# Patient Record
Sex: FEMALE | Race: WHITE | Employment: UNEMPLOYED
[De-identification: names, ages, dates, MRNs, and addresses within clinical notes are randomized per-mention and may not be internally consistent; named-entity substitution may affect disease eponyms.]

---

## 2017-01-12 ENCOUNTER — TELEPHONE (OUTPATIENT)
Dept: FAMILY MEDICINE CLINIC | Facility: CLINIC | Age: 42
End: 2017-01-12

## 2017-01-20 ENCOUNTER — TELEPHONE (OUTPATIENT)
Dept: FAMILY MEDICINE CLINIC | Facility: CLINIC | Age: 42
End: 2017-01-20

## 2017-09-06 ENCOUNTER — OFFICE VISIT (OUTPATIENT)
Dept: FAMILY MEDICINE CLINIC | Age: 42
End: 2017-09-06
Payer: COMMERCIAL

## 2017-09-06 VITALS
BODY MASS INDEX: 29.11 KG/M2 | HEART RATE: 64 BPM | SYSTOLIC BLOOD PRESSURE: 106 MMHG | TEMPERATURE: 97.8 F | OXYGEN SATURATION: 97 % | WEIGHT: 170.5 LBS | DIASTOLIC BLOOD PRESSURE: 62 MMHG | HEIGHT: 64 IN

## 2017-09-06 DIAGNOSIS — F32.A ANXIETY AND DEPRESSION: ICD-10-CM

## 2017-09-06 DIAGNOSIS — N62 LARGE BREASTS: ICD-10-CM

## 2017-09-06 DIAGNOSIS — K62.5 RECTAL BLEED: ICD-10-CM

## 2017-09-06 DIAGNOSIS — G89.29 CHRONIC PAIN OF RIGHT ANKLE: ICD-10-CM

## 2017-09-06 DIAGNOSIS — M54.5 CHRONIC BILATERAL LOW BACK PAIN, WITH SCIATICA PRESENCE UNSPECIFIED: ICD-10-CM

## 2017-09-06 DIAGNOSIS — Z23 NEED FOR TDAP VACCINATION: ICD-10-CM

## 2017-09-06 DIAGNOSIS — K64.9 HEMORRHOIDS, UNSPECIFIED HEMORRHOID TYPE: Primary | ICD-10-CM

## 2017-09-06 DIAGNOSIS — M25.571 CHRONIC PAIN OF RIGHT ANKLE: ICD-10-CM

## 2017-09-06 DIAGNOSIS — F41.9 ANXIETY AND DEPRESSION: ICD-10-CM

## 2017-09-06 DIAGNOSIS — G89.29 CHRONIC BILATERAL LOW BACK PAIN, WITH SCIATICA PRESENCE UNSPECIFIED: ICD-10-CM

## 2017-09-06 PROCEDURE — 99213 OFFICE O/P EST LOW 20 MIN: CPT | Performed by: PHYSICIAN ASSISTANT

## 2017-09-06 ASSESSMENT — ENCOUNTER SYMPTOMS
COUGH: 0
ABDOMINAL PAIN: 0
RHINORRHEA: 0
EYE ITCHING: 0
VOMITING: 0
CHEST TIGHTNESS: 0
SINUS PRESSURE: 0
BOWEL INCONTINENCE: 0
NAUSEA: 0
BACK PAIN: 1
DIARRHEA: 0
SHORTNESS OF BREATH: 0
SORE THROAT: 0
EYE DISCHARGE: 0

## 2017-09-19 ENCOUNTER — ANESTHESIA EVENT (OUTPATIENT)
Dept: OPERATING ROOM | Age: 42
End: 2017-09-19
Payer: COMMERCIAL

## 2017-09-20 ENCOUNTER — HOSPITAL ENCOUNTER (OUTPATIENT)
Age: 42
Setting detail: OUTPATIENT SURGERY
Discharge: HOME | End: 2017-09-20
Attending: COLON & RECTAL SURGERY | Admitting: COLON & RECTAL SURGERY
Payer: COMMERCIAL

## 2017-09-20 ENCOUNTER — ANESTHESIA (OUTPATIENT)
Dept: OPERATING ROOM | Age: 42
End: 2017-09-20
Payer: COMMERCIAL

## 2017-09-20 VITALS
OXYGEN SATURATION: 100 % | SYSTOLIC BLOOD PRESSURE: 104 MMHG | RESPIRATION RATE: 15 BRPM | DIASTOLIC BLOOD PRESSURE: 61 MMHG

## 2017-09-20 VITALS
SYSTOLIC BLOOD PRESSURE: 116 MMHG | RESPIRATION RATE: 14 BRPM | BODY MASS INDEX: 27.74 KG/M2 | OXYGEN SATURATION: 98 % | DIASTOLIC BLOOD PRESSURE: 62 MMHG | HEART RATE: 66 BPM | TEMPERATURE: 97.5 F | HEIGHT: 64 IN | WEIGHT: 162.48 LBS

## 2017-09-20 LAB — HCG, PREGNANCY URINE (POC): NEGATIVE

## 2017-09-20 PROCEDURE — 7100000010 HC PHASE II RECOVERY - FIRST 15 MIN: Performed by: COLON & RECTAL SURGERY

## 2017-09-20 PROCEDURE — 2580000003 HC RX 258: Performed by: ANESTHESIOLOGY

## 2017-09-20 PROCEDURE — 2500000003 HC RX 250 WO HCPCS: Performed by: NURSE ANESTHETIST, CERTIFIED REGISTERED

## 2017-09-20 PROCEDURE — 2580000003 HC RX 258: Performed by: NURSE ANESTHETIST, CERTIFIED REGISTERED

## 2017-09-20 PROCEDURE — 88305 TISSUE EXAM BY PATHOLOGIST: CPT

## 2017-09-20 PROCEDURE — 84703 CHORIONIC GONADOTROPIN ASSAY: CPT

## 2017-09-20 PROCEDURE — 6360000002 HC RX W HCPCS: Performed by: NURSE ANESTHETIST, CERTIFIED REGISTERED

## 2017-09-20 PROCEDURE — 3609010300 HC COLONOSCOPY W/BIOPSY SINGLE/MULTIPLE: Performed by: COLON & RECTAL SURGERY

## 2017-09-20 PROCEDURE — 3700000001 HC ADD 15 MINUTES (ANESTHESIA): Performed by: COLON & RECTAL SURGERY

## 2017-09-20 PROCEDURE — 7100000011 HC PHASE II RECOVERY - ADDTL 15 MIN: Performed by: COLON & RECTAL SURGERY

## 2017-09-20 PROCEDURE — 3700000000 HC ANESTHESIA ATTENDED CARE: Performed by: COLON & RECTAL SURGERY

## 2017-09-20 RX ORDER — SODIUM CHLORIDE 0.9 % (FLUSH) 0.9 %
10 SYRINGE (ML) INJECTION PRN
Status: DISCONTINUED | OUTPATIENT
Start: 2017-09-20 | End: 2017-09-20 | Stop reason: HOSPADM

## 2017-09-20 RX ORDER — SODIUM CHLORIDE, SODIUM LACTATE, POTASSIUM CHLORIDE, CALCIUM CHLORIDE 600; 310; 30; 20 MG/100ML; MG/100ML; MG/100ML; MG/100ML
INJECTION, SOLUTION INTRAVENOUS CONTINUOUS
Status: DISCONTINUED | OUTPATIENT
Start: 2017-09-20 | End: 2017-09-20 | Stop reason: HOSPADM

## 2017-09-20 RX ORDER — LIDOCAINE HYDROCHLORIDE 10 MG/ML
INJECTION, SOLUTION EPIDURAL; INFILTRATION; INTRACAUDAL; PERINEURAL PRN
Status: DISCONTINUED | OUTPATIENT
Start: 2017-09-20 | End: 2017-09-20 | Stop reason: SDUPTHER

## 2017-09-20 RX ORDER — MIDAZOLAM HYDROCHLORIDE 1 MG/ML
1 INJECTION INTRAMUSCULAR; INTRAVENOUS EVERY 5 MIN PRN
Status: DISCONTINUED | OUTPATIENT
Start: 2017-09-20 | End: 2017-09-20 | Stop reason: HOSPADM

## 2017-09-20 RX ORDER — SODIUM CHLORIDE 0.9 % (FLUSH) 0.9 %
10 SYRINGE (ML) INJECTION EVERY 12 HOURS SCHEDULED
Status: DISCONTINUED | OUTPATIENT
Start: 2017-09-20 | End: 2017-09-20 | Stop reason: HOSPADM

## 2017-09-20 RX ORDER — GLYCOPYRROLATE 0.2 MG/ML
INJECTION INTRAMUSCULAR; INTRAVENOUS PRN
Status: DISCONTINUED | OUTPATIENT
Start: 2017-09-20 | End: 2017-09-20 | Stop reason: SDUPTHER

## 2017-09-20 RX ORDER — LIDOCAINE HYDROCHLORIDE 10 MG/ML
1 INJECTION, SOLUTION EPIDURAL; INFILTRATION; INTRACAUDAL; PERINEURAL
Status: DISCONTINUED | OUTPATIENT
Start: 2017-09-20 | End: 2017-09-20 | Stop reason: HOSPADM

## 2017-09-20 RX ORDER — PROPOFOL 10 MG/ML
INJECTION, EMULSION INTRAVENOUS PRN
Status: DISCONTINUED | OUTPATIENT
Start: 2017-09-20 | End: 2017-09-20 | Stop reason: SDUPTHER

## 2017-09-20 RX ORDER — SODIUM CHLORIDE, SODIUM LACTATE, POTASSIUM CHLORIDE, CALCIUM CHLORIDE 600; 310; 30; 20 MG/100ML; MG/100ML; MG/100ML; MG/100ML
INJECTION, SOLUTION INTRAVENOUS CONTINUOUS PRN
Status: DISCONTINUED | OUTPATIENT
Start: 2017-09-20 | End: 2017-09-20 | Stop reason: SDUPTHER

## 2017-09-20 RX ORDER — MIDAZOLAM HYDROCHLORIDE 1 MG/ML
INJECTION INTRAMUSCULAR; INTRAVENOUS PRN
Status: DISCONTINUED | OUTPATIENT
Start: 2017-09-20 | End: 2017-09-20 | Stop reason: SDUPTHER

## 2017-09-20 RX ADMIN — SODIUM CHLORIDE, POTASSIUM CHLORIDE, SODIUM LACTATE AND CALCIUM CHLORIDE: 600; 310; 30; 20 INJECTION, SOLUTION INTRAVENOUS at 07:33

## 2017-09-20 RX ADMIN — LIDOCAINE HYDROCHLORIDE 50 MG: 10 INJECTION, SOLUTION EPIDURAL; INFILTRATION; INTRACAUDAL; PERINEURAL at 07:37

## 2017-09-20 RX ADMIN — PROPOFOL 200 MG: 10 INJECTION, EMULSION INTRAVENOUS at 07:37

## 2017-09-20 RX ADMIN — SODIUM CHLORIDE, POTASSIUM CHLORIDE, SODIUM LACTATE AND CALCIUM CHLORIDE: 600; 310; 30; 20 INJECTION, SOLUTION INTRAVENOUS at 06:39

## 2017-09-20 RX ADMIN — MIDAZOLAM HYDROCHLORIDE 2 MG: 1 INJECTION, SOLUTION INTRAMUSCULAR; INTRAVENOUS at 07:35

## 2017-09-20 RX ADMIN — GLYCOPYRROLATE 0.4 MG: 0.2 INJECTION, SOLUTION INTRAMUSCULAR; INTRAVENOUS at 07:34

## 2017-09-20 RX ADMIN — SODIUM CHLORIDE, POTASSIUM CHLORIDE, SODIUM LACTATE AND CALCIUM CHLORIDE: 600; 310; 30; 20 INJECTION, SOLUTION INTRAVENOUS at 07:54

## 2017-09-20 ASSESSMENT — PAIN SCALES - GENERAL
PAINLEVEL_OUTOF10: 0

## 2017-09-20 ASSESSMENT — ENCOUNTER SYMPTOMS
STRIDOR: 0
SHORTNESS OF BREATH: 0

## 2017-09-20 ASSESSMENT — PAIN - FUNCTIONAL ASSESSMENT: PAIN_FUNCTIONAL_ASSESSMENT: 0-10

## 2017-09-21 LAB — SURGICAL PATHOLOGY REPORT: NORMAL

## 2022-03-11 ENCOUNTER — HOSPITAL ENCOUNTER (OUTPATIENT)
Dept: PHYSICAL THERAPY | Facility: CLINIC | Age: 47
Setting detail: THERAPIES SERIES
Discharge: HOME | End: 2022-03-11
Payer: COMMERCIAL

## 2022-03-11 PROCEDURE — 97161 PT EVAL LOW COMPLEX 20 MIN: CPT

## 2022-03-11 PROCEDURE — 97110 THERAPEUTIC EXERCISES: CPT

## 2022-03-11 PROCEDURE — 97140 MANUAL THERAPY 1/> REGIONS: CPT

## 2022-03-11 NOTE — CONSULTS
[] [facility]) - [facility] &  Therapy  [address]  [phone]  F: [phone] [] [address]  [place] [address]   [address]  P: [phone]  F: [phone] [] [address]  Therapy  [address]  P: [phone]  F: [phone] [] [address]  [facility]   [address]   [place]: [phone]  F: [phone]  [x] [address]  P: [phone]  F: [phone]      Physical Therapy Lower Extremity Evaluation    Date:  3/11/2022  Patient: Mata Kramer  :  1975  MRN: [id number]  Physician: Dr. Calderón Maker: Sanaz ROMERO after 25 visits   Medical Diagnosis: Plantar fascitis   Rehab Codes: M72. 2.  Onset date: ~22  Next 's appt.: TBA    Subjective:   CC: Pain began around  of this year, did fall down stairs twice around that time. Pain is located in heel and shoots up into foot at times. Pain is improved on weekends when not working, worsens when working d/t being on feet throughout the day. Pt does have OTC orthotics (received a month ago) that do help aleviate the pain, does have an order for custom orthotics. Received cortisone injections in Feb which did help alleviate pain.           PMHx: [] Unremarkable [] Diabetes [] HTN  [] Pacemaker   [] MI/Heart Problems [] Cancer [] Arthritis [] Other:              [x] Refer to full medical chart  In EPIC         Tests: [] X-Ray: [] MRI:  [] Other:    Medications: [x] Refer to full medical record [] None [] Other:  Allergies:      [x] Refer to full medical record [] None [] Other:        Marital Status    Home type    Stairs from outside            Hand rail    Stairs inside            [address] [facility] status Full time- nights   Work Activities/duties  On feet all day    Recreational Activities Going for walks       Pain present? Yes   Location Plantar surface of heel   Pain Rating currently 8-9/10 when ambulating    Pain at worse 9/10   Pain at best 0/10   Description of pain Sharp shooting when ambulating, ache constant    Altered Sensation Denies   What makes it worse Ambulating   What makes it better    Symptom progression Worsening    Sleep Not effected                Objective:    ROM  ° A/P STRENGTH    Left Right Left Right   Hip Flex       Ext       ER       IR       ABD       ADD       Knee Flex       Ext       Ankle DF knee ext 15 10 5/5 4+/5   Ankle DF knee flex  22 30     PF   5/5 4+/5   INV       EVER                Bilat heel raise with peroneal weakness noted bilaterally, R>L, therefore not attempted SL     OBSERVATION No Deficit Deficit Not Tested Comments   Posture       Forward Head [] [] []    Rounded Shoulders [] [] []    Observation           Palpation [] [] []    Sensation [] [] []    Edema [] [] []    Neurological [] [] []    Patellar Mobility [] [] []    Patellar Orientation [] [] []    Gait [] [] [] Analysis: Pes planus bilaterally         FUNCTION Normal Difficult Unable   Sitting [x] [] []   Standing [] [x] []   Ambulation [] [x] []   Groom/Dress [x] [] []   Lift/Carry [x] [] []   Stairs [x] [] []   Bending [x] [] []   Squat [x] [] []   Kneel [x] [] []           Flexibility Normal Left tight Right tight   Hip flexor [] [] []   quad [] [] []   HS [] [] []   piriformis [] [] []   ITB [] [] []   gastroc [] [] [x]   Soleus  [] [] [x]                        Functional Test: LEFS Score: 32% functionally impaired         Assessment:   Patient presents with signs and symptoms consistent with plantar fascitis, including pain in calcaneus, TTP in plantar fascia as well as tightness in gastroc.   Patient would benefit from skilled physical therapy services in order to: Decrease tightness in R gastroc and plantar fascia contributing to heel pain. Pt will also benefit from peroneal and foot intrinsic strengthening to improve arch support. Goals:        STG: (to be met in 10 treatments)  1. ? Pain: Decrease pain levels to 4/10 at worst in R heel   2. ? ROM: Increase flexibility and AROM limitations throughout to equal bilat to reduce difficulty with ADLs, increase R DF PROM with knee ext to 20deg  3. ? Strength: Increase R gastroc strenght to perform SL heel raise 10x  4. ? Function: Pt to report completing a full work day without any increase in pain   5. Independent with Home Exercise Programs  6. Demonstrate Knowledge of fall prevention    LTG: (to be met in 20 treatments)  1. Improve score on assessment tool from 32% impaired to 15% impaired, demonstrating an improvement in ADLs  2. Reduce pain levels to 0/10                   Patient goals: To have no pain     Rehab Potential:  [x] Good  [] Fair  [] Poor   Suggested Professional Referral:  [x] No  [] Yes:  Barriers to Goal Achievement[de-identified]  [x] No  [] Yes:  Domestic Concerns:  [x] No  [] Yes:    Pt. Education:  [x] Plans/Goals, Risks/Benefits discussed  [x] Home exercise program    Method of Education: [x] Verbal  [x] Demo  [x] Written  Comprehension of Education:  [x] Verbalizes understanding. [x] Demonstrates understanding. [x] Needs Review. [] Demonstrates/verbalizes understanding of HEP/Ed previously given. Access Code: [email or web address]  URL: [employer]. [email or web address]/  Date: 04/34/1142  Prepared by: Socorro Holliday    Exercises  Foot Dome - 1 x daily - 7 x weekly - 1 sets - 20 reps  Toe Yoga - 1 x daily - 7 x weekly - 1 sets - 20 reps  Long Sitting Calf Stretch with Strap - 1 x daily - 7 x weekly - 2 sets - 3 reps - 30\" hold  Gastroc Stretch on Step - 1 x daily - 7 x weekly - 2 sets - 3 reps - 30\" hold      Treatment Plan:  [x] Therapeutic Exercise    [] Aquatic Therapy  [x] Manual Therapy   [] Electrical Stimulation  [x] Instruction in HEP      [] Lumbar/Cervical Traction  [] Neuromuscular Re-education [x] Cold/hotpack  [] Iontophoresis: 4 mg/mL  [x] Vasocompression ([employer])                    Dexamethasone Sodium  [x] Gait Training             Phosphate 40-80 mAmin  [] Integrative Dry Needling         []  Medication allergies reviewed for use of    Dexamethasone Sodium Phosphate 4mg/ml     with iontophoresis treatments. Pt is not allergic. Frequency:  2 x/week for 20 visits    Todays Treatment:    Exercises:  Exercise  R plantar fascitis    Reps/ Time Weight/ Level Comments   Bike            Long sitting gastroc stretch 3x30\"     Foot Dome x     Toe Yoga x     Step gastroc stretch 3x30\"                                                           Other: MFR to R sided platnar fascia, gastroc with tightness noted medially    Specific Instructions for next treatment: Continue with manual, progress foot intrinsics as tolerated    Evaluation Complexity:  History (Personal factors, comorbidities) [x] 0 [] 1-2 [] 3+   Exam (limitations, restrictions) [x] 1-2 [] 3 [] 4+   Clinical presentation (progression) [x] Stable [] Evolving  [] Unstable   Decision Making [x] Low [] Moderate [] High    [x] Low Complexity [] Moderate Complexity [] High Complexity       Treatment Charges: Mins Units   [x] Evaluation       [x]  Low       []  Moderate       []  High 15 1   []  Modalities     [x]  Ther Exercise 15 1   [x]  Manual Therapy 15 1   []  Ther Activities     []  Aquatics     []  Vasocompression     []  Other       TOTAL TREATMENT TIME: 45 minutes    Time in: 0810    Time Out: 6599    Electronically signed by: Delfino Duncan PT        Physician Signature:________________________________Date:__________________  By signing above or cosigning this note, I have reviewed this plan of care and certify a need for medically necessary rehabilitation services.      *PLEASE SIGN ABOVE AND FAX BACK ALL PAGES*

## 2022-03-17 ENCOUNTER — HOSPITAL ENCOUNTER (OUTPATIENT)
Dept: PHYSICAL THERAPY | Facility: CLINIC | Age: 47
Setting detail: THERAPIES SERIES
Discharge: HOME | End: 2022-03-17
Payer: COMMERCIAL

## 2022-03-17 PROCEDURE — 97110 THERAPEUTIC EXERCISES: CPT

## 2022-03-17 PROCEDURE — 97140 MANUAL THERAPY 1/> REGIONS: CPT

## 2022-03-17 NOTE — FLOWSHEET NOTE
[] [facility] [facility]  [address]  [phone]  F: [phone] [] [address]  [facility]   [address]  P: [phone]  F: [phone] [] [address]  Therapy  [address]  P: [phone]  F: [phone] [x] [address]  P: [phone]  F: [phone] [] [address]  [facility]   [address]   [place]: [phone]  F: [phone]      Physical Therapy Daily Treatment Note    Date:  3/17/2022  Patient Name:  Tapan Guzman    :  1975  MRN: [id number]  Physician: Dr. Wilman Lyman: Sanaz ROMERO after 25 visits   Medical Diagnosis: Plantar fascitis              Rehab Codes: M72. 2.  Onset date: ~22               Next 's appt.: TBA  Visit# / total visits:      Cancels/No Shows: 0/0    Subjective:    Pain:  [] Yes  [] No Location: R heel plantar surface  Pain Rating: (0-10 scale) 6/10  Pain altered Tx:  [] No  [] Yes  Action:  Comments: Pt states to noticing a decrease in stabbing pain since last visit, currently having an \"ache\". Objective:   Todays Treatment:     Exercises:  Exercise  R plantar fascitis     Reps/ Time Weight/ Level Comments   Bike  5'                 Long sitting gastroc stretch 3x30\"       Foot Dome x       Toe Yoga x       Step gastroc stretch 3x30\"        SB stretch 3x30\"                                                                                       Other: MFR to R sided platnar fascia, gastroc with tightness noted medially     Specific Instructions for next treatment: Continue with manual, progress foot intrinsics as tolerated        Treatment Charges: Mins Units   []  Modalities     [x]  Ther Exercise 15 1   [x]  Manual Therapy 20 1   [] Ther Activities     []  Aquatics     []  Vasocompression     []  Other     Total Treatment time 35 2       Assessment: [x] Progressing toward goals. Initiated treatment on bike followed by manual, continued tightness noted in plantar fascia and medial gastroc; decreased tightness post. Cotninued with foot intrinsics with improved performance of foot doming. [] No change. [] Other:  [] Patient presents with signs and symptoms consistent with plantar fascitis, including pain in calcaneus, TTP in plantar fascia as well as tightness in gastroc. Patient would benefit from skilled physical therapy services in order to: Decrease tightness in R gastroc and plantar fascia contributing to heel pain. Pt will also benefit from peroneal and foot intrinsic strengthening to improve arch support.            Goals:                               STG: (to be met in 10 treatments)  1. ? Pain: Decrease pain levels to 4/10 at worst in R heel   2. ? ROM: Increase flexibility and AROM limitations throughout to equal bilat to reduce difficulty with ADLs, increase R DF PROM with knee ext to 20deg  3. ? Strength: Increase R gastroc strenght to perform SL heel raise 10x  4. ? Function: Pt to report completing a full work day without any increase in pain   5. Independent with Home Exercise Programs  6. Demonstrate Knowledge of fall prevention     LTG: (to be met in 20 treatments)  1. Improve score on assessment tool from 32% impaired to 15% impaired, demonstrating an improvement in ADLs  2. Reduce pain levels to 0/10    Pt. Education:  [x] Yes  [] No  [] Reviewed Prior HEP/Ed  Method of Education: [x] Verbal  [x] Demo  [] Written  Comprehension of Education:  [x] Verbalizes understanding. [x] Demonstrates understanding. [] Needs review. [] Demonstrates/verbalizes HEP/Ed previously given. Plan: [x] Continue current frequency toward long and short term goals.           Time In: 0800            Time Out: 7768    Electronically signed by:  Chanel Maldonado, PT

## 2022-03-23 ENCOUNTER — APPOINTMENT (OUTPATIENT)
Dept: PHYSICAL THERAPY | Facility: CLINIC | Age: 47
End: 2022-03-23
Payer: COMMERCIAL

## 2022-03-25 ENCOUNTER — HOSPITAL ENCOUNTER (OUTPATIENT)
Dept: PHYSICAL THERAPY | Facility: CLINIC | Age: 47
Setting detail: THERAPIES SERIES
Discharge: HOME | End: 2022-03-25
Payer: COMMERCIAL

## 2022-03-25 PROCEDURE — 97110 THERAPEUTIC EXERCISES: CPT

## 2022-03-25 PROCEDURE — 97140 MANUAL THERAPY 1/> REGIONS: CPT

## 2022-03-25 NOTE — FLOWSHEET NOTE
[] [facility]  [address]  [phone]  F: [phone] [] [address]  [facility]   [address]  P: [phone]  F: [phone] [] [address]  Therapy  [address] [facility]  P: [phone]  F: [phone] [x] [address]  P: [phone]  F: [phone] [] [address]  [facility]   [address]   [place]: [phone]  F: [phone]      Physical Therapy Daily Treatment Note    Date:  3/25/2022  Patient Name:  Hernandez Pearson    :  1975  MRN: [id number]  Physician: Dr. Hang Fatima: Sanaz ROMERO after 25 visits   Medical Diagnosis: Plantar fascitis              Rehab Codes: M72. 2.  Onset date: ~22               Next 's appt.: TBA  Visit# / total visits: 3/20     Cancels/No Shows: 0/0    Subjective:    Pain:  [] Yes  [] No Location: R heel plantar surface  Pain Rating: (0-10 scale) 3/10  Pain altered Tx:  [] No  [] Yes  Action:  Comments: Pt states to decreased pain secondary to purchasing a night splint to wear at home. Objective:   Todays Treatment:     Exercises:  Exercise  R plantar fascitis     Reps/ Time Weight/ Level Comments   Bike  5'    not today              Long sitting gastroc stretch 3x30\"       Foot Dome x       Toe Yoga x       Step gastroc stretch 3x30\"        SB stretch 3x30\"        MOBO board rocks  20x ea                                                                             Other: MFR to R sided platnar fascia, gastroc with tightness noted medially     Specific Instructions for next treatment: Continue with manual, progress foot intrinsics as tolerated        Treatment Charges: Mins Units   []  Modalities     [x]  Ther Exercise 15 1   [x]  Manual short term goals.           Time In: 0800            Time Out: 4048    Electronically signed by:  Sindi Nava PT

## 2022-03-30 ENCOUNTER — HOSPITAL ENCOUNTER (OUTPATIENT)
Dept: PHYSICAL THERAPY | Facility: CLINIC | Age: 47
Setting detail: THERAPIES SERIES
Discharge: HOME | End: 2022-03-30
Payer: COMMERCIAL

## 2022-03-30 PROCEDURE — 97110 THERAPEUTIC EXERCISES: CPT

## 2022-03-30 PROCEDURE — 97140 MANUAL THERAPY 1/> REGIONS: CPT

## 2022-03-30 NOTE — FLOWSHEET NOTE
[] [facility]  [address]  [phone]  F: [phone] [] [address]  [facility] 36   [address]  P: [phone]  F: [phone] [] [address]  Therapy  [address] [facility]  P: [phone]  F: [phone] [x] [address]  P: [phone]  F: [phone] [] [address]  [facility]   [address]   [place]: [phone]  F: [phone]      Physical Therapy Daily Treatment Note    Date:  3/30/2022  Patient Name:  Rolly Johnson    :  1975  MRN: [id number]  Physician: Dr. Leonora Anthony: Sanaz ROMERO after 25 visits   Medical Diagnosis: Plantar fascitis              Rehab Codes: M72. 2.  Onset date: ~22               Next 's appt.: TBA  Visit# / total visits:      Cancels/No Shows: 0/0    Subjective:    Pain:  [] Yes  [] No Location: R heel plantar surface  Pain Rating: (0-10 scale) 3/10  Pain altered Tx:  [] No  [] Yes  Action:  Comments: Pt arrives with decreased pain overall. States to wearing orthotics for two weeks, however notices an increase in arch pain after wearing. Therefore tried not wearing last night at work, and has less pain upon arrival today. Pt does not work again until  night, educated pt to again trying not wearing orthotics to see if pain continues to decrease. Objective:   Todays Treatment:     Exercises:  Exercise  R plantar fascitis     Reps/ Time Weight/ Level Comments   Bike  5'               Long sitting gastroc stretch 3x30\"       Foot Dome- Seated x       Toe Yoga- Seated x       Step gastroc stretch 3x30\"        SB stretch 3x30\"        MOBO board rocks - Seated 20x ea                                                                             Other: MFR to R sided platnar fascia, gastroc with tightness noted medially     Specific Instructions for next treatment: Assess patient's response to holding orthotics while at work. Progress foot intrinsics to standing if again arrives without pain. Treatment Charges: Mins Units   []  Modalities     [x]  Ther Exercise 15 1   [x]  Manual Therapy 20 1   []  Ther Activities     []  Aquatics     []  Vasocompression     []  Other     Total Treatment time 35 2       Assessment: [x] Progressing toward goals. Initiated treatment on bike followed by manual, continued tightness noted in plantar fascia and medial gastroc; decreased tightness post. Continued with seated foot intrinsics, pt with contineud difficulty with isolating EHL, possibly secondary to webbed second and third toe. [] No change. [] Other:  [] Patient presents with signs and symptoms consistent with plantar fascitis, including pain in calcaneus, TTP in plantar fascia as well as tightness in gastroc. Patient would benefit from skilled physical therapy services in order to: Decrease tightness in R gastroc and plantar fascia contributing to heel pain. Pt will also benefit from peroneal and foot intrinsic strengthening to improve arch support.            Goals:                               STG: (to be met in 10 treatments)  1. ? Pain: Decrease pain levels to 4/10 at worst in R heel   2. ? ROM: Increase flexibility and AROM limitations throughout to equal bilat to reduce difficulty with ADLs, increase R DF PROM with knee ext to 20deg  3. ? Strength: Increase R gastroc strenght to perform SL heel raise 10x  4. ? Function: Pt to report completing a full work day without any increase in pain   5. Independent with Home Exercise Programs  6. Demonstrate Knowledge of fall prevention     LTG: (to be met in 20 treatments)  1.  Improve score on assessment tool from 32% impaired to 15% impaired, demonstrating an improvement in ADLs  2. Reduce pain levels to 0/10    Pt. Education:  [x] Yes  [] No  [] Reviewed Prior HEP/Ed  Method of Education: [x] Verbal  [x] Demo  [] Written  Comprehension of Education:  [x] Verbalizes understanding. [x] Demonstrates understanding. [] Needs review. [] Demonstrates/verbalizes HEP/Ed previously given. Plan: [x] Continue current frequency toward long and short term goals.           Time In: 0900            Time Out: 0935    Electronically signed by:  Leticia Badillo PT

## 2022-04-01 ENCOUNTER — APPOINTMENT (OUTPATIENT)
Dept: PHYSICAL THERAPY | Facility: CLINIC | Age: 47
End: 2022-04-01
Payer: COMMERCIAL

## 2022-04-04 ENCOUNTER — HOSPITAL ENCOUNTER (OUTPATIENT)
Dept: PHYSICAL THERAPY | Facility: CLINIC | Age: 47
Setting detail: THERAPIES SERIES
Discharge: HOME | End: 2022-04-04
Payer: COMMERCIAL

## 2022-04-04 PROCEDURE — 97140 MANUAL THERAPY 1/> REGIONS: CPT

## 2022-04-04 PROCEDURE — 97110 THERAPEUTIC EXERCISES: CPT

## 2022-04-04 NOTE — FLOWSHEET NOTE
[] [facility]  [address]  [phone]  F: [phone] [] [address]  [facility]   [address]  P: [phone]  F: [phone] [] [address] &  Therapy  [address]  P: [phone]  F: [phone] [x] [address]  P: [phone]  F: [phone] [] [address]  [facility]   [address]   [place]: [phone]  F: [phone]      Physical Therapy Daily Treatment Note    Date:  2022  Patient Name:  Ana Bazan    :  1975  MRN: [id number]  Physician: Dr. Deysi Garcia: Sanaz ROMERO after 25 visits   Medical Diagnosis: Plantar fascitis              Rehab Codes: M72. 2.  Onset date: ~22               Next 's appt.: TBA  Visit# / total visits:      Cancels/No Shows: 0/0    Subjective:    Pain:  [] Yes  [] No Location: R heel plantar surface  Pain Rating: (0-10 scale) 2/10  Pain altered Tx:  [] No  [] Yes  Action:  Comments: Pt reports this is the best her foot has felt yet. Objective: Todays Treatment:     Exercises:  Exercise  R plantar fascitis     Reps/ Time Weight/ Level Comments    Bike  5'   x              Long sitting gastroc/soleus stretch 3x30\"     x   Foot Dome- Standing  10x 5\"   x   Toe Yoga- Standing 10x     x   Step gastroc stretch 3x30\"     x    SB stretch 3x30\"     x    MOBO board rocks - Standing 15x ea     x                                                                                Other: MFR to R sided platnar fascia, gastroc with tightness noted medially     Specific Instructions for next treatment: Assess patient's response to holding orthotics while at work. Progress foot intrinsics to standing if again arrives without pain. Treatment Charges: Mins Units   []  Modalities     [x]  Ther Exercise 30 2   [x]  Manual Therapy 15 1   []  Ther Activities     []  Aquatics     []  Vasocompression     []  Other     Total Treatment time 45 3       Assessment: [x] Progressing toward goals. Progressed pt with foot intrinsic exercises in standing with fair to good bel, pt requires max verbal, tactile, and visual cues with mirror to keep ankle neutral throughout strengthening exercises with good follow through noted. Pt reports slight incr pain in heel with standing foot strengthening exercises, however pain decr when she was able to maintain neutral ankle posture and avoid foot pronation. PTA advised pt to ice at home if pain persists. [] No change. [] Other:  [x] Patient presents with signs and symptoms consistent with plantar fascitis, including pain in calcaneus, TTP in plantar fascia as well as tightness in gastroc. Patient would benefit from skilled physical therapy services in order to: Decrease tightness in R gastroc and plantar fascia contributing to heel pain. Pt will also benefit from peroneal and foot intrinsic strengthening to improve arch support.            Goals:  STG: (to be met in 10 treatments)  1. ? Pain: Decrease pain levels to 4/10 at worst in R heel   2. ? ROM: Increase flexibility and AROM limitations throughout to equal bilat to reduce difficulty with ADLs, increase R DF PROM with knee ext to 20deg  3. ? Strength: Increase R gastroc strenght to perform SL heel raise 10x  4. ? Function: Pt to report completing a full work day without any increase in pain   5. Independent with Home Exercise Programs  6. Demonstrate Knowledge of fall prevention     LTG: (to be met in 20 treatments)  1. Improve score on assessment tool from 32% impaired to 15% impaired, demonstrating an improvement in ADLs  2. Reduce pain levels to 0/10    Pt.  Education:  [x] Yes  [] No  [] Reviewed Prior HEP/Ed  Method of Education: [x] Verbal [x] Demo  [] Written  Comprehension of Education:  [x] Verbalizes understanding. [x] Demonstrates understanding. [] Needs review. [] Demonstrates/verbalizes HEP/Ed previously given. Plan: [x] Continue current frequency toward long and short term goals.           Time In: 8:00am            Time Out: 8:45am    Electronically signed by:  Yarelis Rey PTA

## 2022-04-07 ENCOUNTER — HOSPITAL ENCOUNTER (OUTPATIENT)
Dept: PHYSICAL THERAPY | Facility: CLINIC | Age: 47
Setting detail: THERAPIES SERIES
Discharge: HOME | End: 2022-04-07
Payer: COMMERCIAL

## 2022-04-07 PROCEDURE — 97140 MANUAL THERAPY 1/> REGIONS: CPT

## 2022-04-07 PROCEDURE — 97016 VASOPNEUMATIC DEVICE THERAPY: CPT

## 2022-04-07 PROCEDURE — 97110 THERAPEUTIC EXERCISES: CPT

## 2022-04-07 NOTE — FLOWSHEET NOTE
[] [address]  [address]  [phone]  F: [phone] [] [address]  [facility] 36   [address]  P: [phone]  F: [phone] [] [address]  Therapy  [address]  P: [phone]  F: [phone] [x] [address]  P: [phone]  F: [phone] [] [address]  [facility]   [address]   [place]: [phone]  F: [phone]      Physical Therapy Daily Treatment Note    Date:  2022  Patient Name:  Jackie López    :  1975  MRN: [id number]  Physician: Dr. Clark Allen: Sanaz ROMERO after 25 visits   Medical Diagnosis: Plantar fascitis              Rehab Codes: M72. 2.  Onset date: ~22               Next 's appt.: TBA  Visit# / total visits:      Cancels/No Shows: 0/0    Subjective:    Pain:  [x] Yes  [] No Location: R heel plantar surface  Pain Rating: (0-10 scale) 6-7/10  Pain altered Tx:  [] No  [x] Yes  Action:  Comments: Pt reports working over the last two nights with increased pain at 6-7/10. Pt has not been wearing inserts in work boots. Objective:   Todays Treatment:     Exercises:  Exercise  R plantar fascitis     Reps/ Time Weight/ Level Comments    Bike  5'   x              Long sitting gastroc/soleus stretch 3x30\"     x   Foot Dome- Standing  10x 10\"   x   Toe Yoga- Standing 2x10     x   Step gastroc stretch 3x30\"     x    SB stretch 3x30\"     x    MOBO board rocks - Standing 15x ea    with L LE on 2\" step  --              Lat tband walks  2 laps     band around feet  x                                                          Other: MFR to R platnar fascia, gastroc with tightness noted medially     Specific Instructions for next treatment: Assess patient's response to holding orthotics while at work. Progress foot intrinsics to standing if again arrives without pain. Treatment Charges: Mins Units   []  Modalities     [x]  Ther Exercise 30 2   [x]  Manual Therapy 15 1   []  Ther Activities     []  Aquatics     [x]  Vasocompression 10 1   []  Other     Total Treatment time 55 4       Assessment: [x] Progressing toward goals. Initiated tx with warm up on bike followed by manual with pt reporting complete pain relief in R foot upon standing. Pt then completed standing gastroc/soleus stretches, reports pain increased to 4/10 after stretches. Pt tehn completed foot intrinsic strengthening with emphasis on ankle neutral with good bel. Added lateral band walks with good bel. Ended with vaso for pain and inflammation management. [] No change. [] Other:  [x] Patient presents with signs and symptoms consistent with plantar fascitis, including pain in calcaneus, TTP in plantar fascia as well as tightness in gastroc. Patient would benefit from skilled physical therapy services in order to: Decrease tightness in R gastroc and plantar fascia contributing to heel pain. Pt will also benefit from peroneal and foot intrinsic strengthening to improve arch support.         Goals:  STG: (to be met in 10 treatments)  1. ? Pain: Decrease pain levels to 4/10 at worst in R heel   2. ? ROM: Increase flexibility and AROM limitations throughout to equal bilat to reduce difficulty with ADLs, increase R DF PROM with knee ext to 20deg  3. ? Strength: Increase R gastroc strenght to perform SL heel raise 10x  4. ? Function: Pt to report completing a full work day without any increase in pain   5. Independent with Home Exercise Programs  6. Demonstrate Knowledge of fall prevention     LTG: (to be met in 20 treatments)  1. Improve score on assessment tool from 32% impaired to 15% impaired, demonstrating an improvement in ADLs  2. Reduce pain levels to 0/10    Pt.  Education: [x] Yes  [] No  [] Reviewed Prior HEP/Ed  Method of Education: [x] Verbal  [x] Demo  [] Written  Comprehension of Education:  [x] Verbalizes understanding. [x] Demonstrates understanding. [] Needs review. [] Demonstrates/verbalizes HEP/Ed previously given. Plan: [x] Continue current frequency toward long and short term goals.           Time In: 8:00am            Time Out: 9:00am    Electronically signed by:  Nico Jordan, PTA

## 2022-04-12 ENCOUNTER — HOSPITAL ENCOUNTER (OUTPATIENT)
Dept: PHYSICAL THERAPY | Facility: CLINIC | Age: 47
Setting detail: THERAPIES SERIES
Discharge: HOME | End: 2022-04-12
Payer: COMMERCIAL

## 2022-04-12 PROCEDURE — 97110 THERAPEUTIC EXERCISES: CPT

## 2022-04-12 PROCEDURE — 97140 MANUAL THERAPY 1/> REGIONS: CPT

## 2022-04-12 PROCEDURE — 97016 VASOPNEUMATIC DEVICE THERAPY: CPT

## 2022-04-12 NOTE — FLOWSHEET NOTE
[] [address]  [address]  [phone]  F: [phone] [] [address]  [facility]   [address]  P: [phone]  F: [phone] [] [address]  Therapy  [address]  P: [phone]  F: [phone] [x] [address]  P: [phone]  F: [phone] [] [address]  [facility]   [address]   [place]: [phone]  F: [phone]      Physical Therapy Daily Treatment Note    Date:  2022  Patient Name:  Sinan Stoner    :  1975  MRN: [id number]  Physician: Dr. Violet Mitchell: Sanaz ROMERO after 25 visits   Medical Diagnosis: Plantar fascitis              Rehab Codes: M72. 2.  Onset date: ~22               Next 's appt.: TBA  Visit# / total visits:      Cancels/No Shows: 0/0    Subjective:    Pain:  [x] Yes  [] No Location: R heel plantar surface  Pain Rating: (0-10 scale) 4/10  Pain altered Tx:  [] No  [x] Yes  Action:  Comments: Pt states she feels as though she is \"on the mend\" with slightly decreased R heel pain. Objective:   Todays Treatment:     Exercises:  Exercise  R plantar fascitis     Reps/ Time Weight/ Level Comments    Bike  5'   x              Long sitting gastroc/soleus stretch 3x30\"     x   Foot Dome- Standing  10x 10\"   x   Toe Yoga- Standing 2x10     x   Step gastroc stretch 3x30\"     x    SB stretch 3x30\"    gastroc and soleus  x    MOBO board rocks - Standing 15x ea    with L LE on 2\" step  x              Lat tband walks  2 laps     band around feet  -   Other: MFR to R platnar fascia, gastroc with tightness noted medially     Specific Instructions for next treatment: Consider IASTM via sidekick to PF     Treatment Charges: Mins Units   [] Modalities     [x]  Ther Exercise 20 1   [x]  Manual Therapy 10 1   []  Ther Activities     []  Aquatics     [x]  Vasocompression 10 1   []  Other     Total Treatment time 40 3       Assessment: [x] Progressing toward goals. Initiated tx with bike x5' with good bel. PTA continued with deep STM and hypervolt to R gastroc/soleus complex to decrease tension placed on PF. Pt demos difficulty with GTE during toe yoga. Sig improved technique noted with foot doming with neutral ankle. Ended with vaso for inflammation management. [] No change. [] Other:    [x] Patient presents with signs and symptoms consistent with plantar fascitis, including pain in calcaneus, TTP in plantar fascia as well as tightness in gastroc. Patient would benefit from skilled physical therapy services in order to: Decrease tightness in R gastroc and plantar fascia contributing to heel pain. Pt will also benefit from peroneal and foot intrinsic strengthening to improve arch support.         Goals:  STG: (to be met in 10 treatments)  1. ? Pain: Decrease pain levels to 4/10 at worst in R heel   2. ? ROM: Increase flexibility and AROM limitations throughout to equal bilat to reduce difficulty with ADLs, increase R DF PROM with knee ext to 20deg  3. ? Strength: Increase R gastroc strenght to perform SL heel raise 10x  4. ? Function: Pt to report completing a full work day without any increase in pain   5. Independent with Home Exercise Programs  6. Demonstrate Knowledge of fall prevention     LTG: (to be met in 20 treatments)  1. Improve score on assessment tool from 32% impaired to 15% impaired, demonstrating an improvement in ADLs  2. Reduce pain levels to 0/10    Pt. Education:  [x] Yes  [] No  [] Reviewed Prior HEP/Ed  Method of Education: [x] Verbal  [x] Demo  [] Written  Comprehension of Education:  [x] Verbalizes understanding. [x] Demonstrates understanding. [] Needs review.   [] Demonstrates/verbalizes HEP/Ed previously given.     Plan: [x] Continue current frequency toward long and short term goals.           Time In: 3:30pm            Time Out: 4:10pm    Electronically signed by:  Martha Montes De Oca PTA

## 2022-04-14 ENCOUNTER — HOSPITAL ENCOUNTER (OUTPATIENT)
Dept: PHYSICAL THERAPY | Facility: CLINIC | Age: 47
Setting detail: THERAPIES SERIES
Discharge: HOME | End: 2022-04-14
Payer: COMMERCIAL

## 2022-04-14 NOTE — FLOWSHEET NOTE
[] [address]  [address]    [phone]  F: [phone]   [] [address]  [facility]   [address]  P: [phone]  F: [phone]  [] [place]  [address] [facility] [address]  P: [phone]  F: [phone] [x] [address]: [phone]  F: [phone]  [] [address]  [facility]   [address]   [place]: [phone]  F: [phone]   [] [address]  [place]: [phone]   F: [phone]     Physical Therapy Cancel/No Show note    Date: 2022  Patient: Peng Powers  : 1975  MRN: [id number]    Cancels/No Shows to date:     For today's appointment patient:    [x]  Cancelled    [] Rescheduled appointment    [] No-show     Reason given by patient:    []  Patient ill    []  Conflicting appointment    [] No transportation      [] Conflict with work    [x] No reason given    [] Weather related    [] COVID-19    [x] Other:      Comments: Pt states she will call next week to reschedule         [] Next appointment was confirmed    Electronically signed by: Karlee Chavez, PT

## 2022-04-21 ENCOUNTER — HOSPITAL ENCOUNTER (OUTPATIENT)
Dept: PHYSICAL THERAPY | Facility: CLINIC | Age: 47
Setting detail: THERAPIES SERIES
Discharge: HOME | End: 2022-04-21
Payer: COMMERCIAL

## 2022-04-21 PROCEDURE — 97140 MANUAL THERAPY 1/> REGIONS: CPT

## 2022-04-21 PROCEDURE — 97110 THERAPEUTIC EXERCISES: CPT

## 2022-04-21 NOTE — FLOWSHEET NOTE
PF     Treatment Charges: Mins Units   []  Modalities     [x]  Ther Exercise 23 2   [x]  Manual Therapy 18 1   []  Ther Activities     []  Aquatics     [x]  Vasocompression     []  Other     Total Treatment time 41 3       Assessment: [x] Progressing toward goals. Pt continued with bike to warm up and then received manual therapy including STM and hypervolt to gastroc and soleus to decrease muscle tension and pain. Continued with therex as listed above. [] No change. [] Other:    [x] Patient presents with signs and symptoms consistent with plantar fascitis, including pain in calcaneus, TTP in plantar fascia as well as tightness in gastroc. Patient would benefit from skilled physical therapy services in order to: Decrease tightness in R gastroc and plantar fascia contributing to heel pain. Pt will also benefit from peroneal and foot intrinsic strengthening to improve arch support.         Goals:  STG: (to be met in 10 treatments)  1. ? Pain: Decrease pain levels to 4/10 at worst in R heel   2. ? ROM: Increase flexibility and AROM limitations throughout to equal bilat to reduce difficulty with ADLs, increase R DF PROM with knee ext to 20deg  3. ? Strength: Increase R gastroc strenght to perform SL heel raise 10x  4. ? Function: Pt to report completing a full work day without any increase in pain   5. Independent with Home Exercise Programs  6. Demonstrate Knowledge of fall prevention     LTG: (to be met in 20 treatments)  1. Improve score on assessment tool from 32% impaired to 15% impaired, demonstrating an improvement in ADLs  2. Reduce pain levels to 0/10    Pt. Education:  [x] Yes  [] No  [] Reviewed Prior HEP/Ed  Method of Education: [x] Verbal  [x] Demo  [] Written  Comprehension of Education:  [x] Verbalizes understanding. [x] Demonstrates understanding. [] Needs review. [] Demonstrates/verbalizes HEP/Ed previously given. Plan: [x] Continue current frequency toward long and short term goals. Time In: 0800            Time Out: 0213    Electronically signed by:  Sita Coreas PTA

## 2022-04-26 ENCOUNTER — HOSPITAL ENCOUNTER (OUTPATIENT)
Dept: PHYSICAL THERAPY | Facility: CLINIC | Age: 47
Setting detail: THERAPIES SERIES
Discharge: HOME | End: 2022-04-26
Payer: COMMERCIAL

## 2022-04-26 PROCEDURE — 97110 THERAPEUTIC EXERCISES: CPT

## 2022-04-26 PROCEDURE — 97140 MANUAL THERAPY 1/> REGIONS: CPT

## 2022-04-26 NOTE — FLOWSHEET NOTE
[] [address]  [address]  [phone]  F: [phone] [] [address]  [facility]   [address]  P: [phone]  F: [phone] [] [address]  Therapy  [address] [facility]  P: [phone]  F: [phone] [x] [address]  P: [phone]  F: [phone] [] [address]  [facility]   [address]   [place]: [phone]  F: [phone]      Physical Therapy Daily Treatment Note    Date:  2022  Patient Name:  Troy Mcdonald    :  1975  MRN: [id number]  Physician: Dr. Patel Para: Sanaz ROMERO after 25 visits   Medical Diagnosis: Plantar fascitis              Rehab Codes: M72. 2.  Onset date: ~22               Next 's appt.: TBA  Visit# / total visits:      Cancels/No Shows: 1/0    Subjective:    Pain:  [x] Yes  [] No Location: R heel plantar surface  Pain Rating: (0-10 scale) 3-4/10  Pain altered Tx:  [] No  [x] Yes  Action:  Comments: Pt reports R heel is feeling pretty good this morning, states she did work last night, however was not on her feet as much as normal.     Objective:   Todays Treatment:     Exercises:  Exercise  R plantar fascitis     Reps/ Time Weight/ Level Comments    Bike  5'   x              Giuliana gastroc str 3x30\"     x   Foot Dome- Standing  10x 10\"   x   Toe Yoga- Standing 2x10     x   Step gastroc stretch 3x30\"     x    SB stretch 3x30\"    gastroc and soleus  x    MOBO board rocks - Standing 20x ea    with L LE on 2\" step  x   Ball roll  3'  On PF - seated  x              Lat tband walks  2 laps     band around feet  -   Other: IASTM via Hawk  to R PF, deep STM to R gastroc/soleus     Specific Instructions for next treatment: Consider (with shoes on) DL squat to chair, SLS, SLS with 3 way kick    Treatment Charges: Mins Units   []  Modalities     [x]  Ther Exercise 30 2   [x]  Manual Therapy 15 1   []  Ther Activities     []  Aquatics     [x]  Vasocompression     []  Other     Total Treatment time 45 3       Assessment: [x] Progressing toward goals. PTA performed aggressive IASTM via Hawk  to R PF as well as deep STM to R gastroc and soleus. Upon standing with shoes on, pt denies all pain. Pt cont with stretches as well as foot intrinsic strengthening with fair bel, d/t incr pain when standing without shoe on. Plan to progress next visit focusing on foot arch during functional and stability exercises. [] No change. [] Other:    [x] Patient presents with signs and symptoms consistent with plantar fascitis, including pain in calcaneus, TTP in plantar fascia as well as tightness in gastroc. Patient would benefit from skilled physical therapy services in order to: Decrease tightness in R gastroc and plantar fascia contributing to heel pain. Pt will also benefit from peroneal and foot intrinsic strengthening to improve arch support.         Goals:  STG: (to be met in 10 treatments)  1. ? Pain: Decrease pain levels to 4/10 at worst in R heel   2. ? ROM: Increase flexibility and AROM limitations throughout to equal bilat to reduce difficulty with ADLs, increase R DF PROM with knee ext to 20deg  3. ? Strength: Increase R gastroc strenght to perform SL heel raise 10x  4. ? Function: Pt to report completing a full work day without any increase in pain   5. Independent with Home Exercise Programs  6. Demonstrate Knowledge of fall prevention     LTG: (to be met in 20 treatments)  1. Improve score on assessment tool from 32% impaired to 15% impaired, demonstrating an improvement in ADLs  2. Reduce pain levels to 0/10    Pt.  Education:  [x] Yes  [] No  [] Reviewed Prior HEP/Ed  Method of Education: [x] Verbal  [x] Demo  [] Written  Comprehension of Education:  [x] Verbalizes understanding. [x] Demonstrates understanding. [] Needs review. [] Demonstrates/verbalizes HEP/Ed previously given. Plan: [x] Continue current frequency toward long and short term goals.           Time In: 8:00am            Time Out: 8:50am    Electronically signed by:  Jenny Rodgers PTA

## 2022-04-29 ENCOUNTER — HOSPITAL ENCOUNTER (OUTPATIENT)
Dept: PHYSICAL THERAPY | Facility: CLINIC | Age: 47
Setting detail: THERAPIES SERIES
Discharge: HOME | End: 2022-04-29
Payer: COMMERCIAL

## 2022-04-29 PROCEDURE — 97140 MANUAL THERAPY 1/> REGIONS: CPT

## 2022-04-29 PROCEDURE — 97110 THERAPEUTIC EXERCISES: CPT

## 2022-04-29 NOTE — FLOWSHEET NOTE
[] [address]  [address]  [phone]  F: [phone] [] [address]  [facility]   [address]  P: [phone]  F: [phone] [] [address]  Therapy  [address]  P: [phone]  F: [phone] [x] [address]  P: [phone]  F: [phone] [] [address]  [facility]   [address]   [place]: [phone]  F: [phone]      Physical Therapy Daily Treatment Note    Date:  2022  Patient Name:  Yolanda Garcia    :  1975  MRN: [id number]  Physician: Dr. Evelyn Day: Sanaz ROMERO after 25 visits   Medical Diagnosis: Plantar fascitis              Rehab Codes: M72. 2.  Onset date: ~22               Next 's appt.: TBA  Visit# / total visits: 10/20     Cancels/No Shows: 1/0    Subjective:    Pain:  [x] Yes  [] No Location: R heel plantar surface  Pain Rating: (0-10 scale) 3-4/10  Pain altered Tx:  [] No  [x] Yes  Action:  Comments: Pt has pain located at the heel that is currently 3/10, noted that pain gets up to 7/10 at work. Pt is currently wearing older boots, but she is getting new boots on monday. Objective:   Todays Treatment:     Exercises:  Exercise  R plantar fascitis     Reps/ Time Weight/ Level Comments    Bike  5'   x              Giuliana gastroc str 3x30\"     x   Foot Dome- Standing  10x 10\"   x   Toe Yoga- Standing 2x10     x   Step gastroc stretch 3x30\"     x    SB stretch 3x30\"    gastroc and soleus  x    MOBO board rocks - Standing 20x ea    with L LE on 2\" step  x   Ball roll  3'  On PF - seated  x   DL squat to chair 2x10   x   SLS 2x30\"   x   SLS with 3 way kick 5x ea   x              Lat tband walks  2 laps     band around feet  -   Other: Written  Comprehension of Education:  [x] Verbalizes understanding. [x] Demonstrates understanding. [] Needs review. [] Demonstrates/verbalizes HEP/Ed previously given. Plan: [x] Continue current frequency toward long and short term goals.           Time In: 0800            Time Out: 0900    Electronically signed by:  Claus Alex PTA

## 2022-05-03 ENCOUNTER — HOSPITAL ENCOUNTER (OUTPATIENT)
Dept: PHYSICAL THERAPY | Facility: CLINIC | Age: 47
Setting detail: THERAPIES SERIES
Discharge: HOME | End: 2022-05-03
Payer: COMMERCIAL

## 2022-05-03 PROCEDURE — 97140 MANUAL THERAPY 1/> REGIONS: CPT

## 2022-05-03 PROCEDURE — 97110 THERAPEUTIC EXERCISES: CPT

## 2022-05-03 NOTE — FLOWSHEET NOTE
[] [address]  [address]  [phone]  F: [phone] [] [address]  [facility] 36   [address]  P: [phone]  F: [phone] [] [address]  Therapy  [address] [facility]  P: [phone]  F: [phone] [x] [address]  P: [phone]  F: [phone] [] [address]  [facility]   [address]   [place]: [phone]  F: [phone]      Physical Therapy Daily Treatment Note    Date:  5/3/2022  Patient Name:  Negra Jordan    :  1975  MRN: [id number]  Physician: Dr. Pete Poisson: Sanaz ROMERO after 25 visits   Medical Diagnosis: Plantar fascitis              Rehab Codes: M72. 2.  Onset date: ~22               Next 's appt.: TBA  Visit# / total visits:      Cancels/No Shows: 1/0    Subjective:    Pain:  [x] Yes  [] No Location: R heel plantar surface  Pain Rating: (0-10 scale) 2-3/10  Pain altered Tx:  [] No  [x] Yes  Action:  Comments: Pt states pain reached 3-4/10 last night at work, and up to 4-5/10 over the weekend. Objective: Todays Treatment:     Exercises:  Exercise  R plantar fascitis     Reps/ Time Weight/ Level Comments    Bike  5'   x              Giuliana gastroc str 3x30\"     x   Foot Dome- Standing  10x 10\"   -   Toe Yoga- Standing 2x10     -   Step gastroc stretch 3x30\"     x    SB stretch 3x30\"    gastroc and soleus  x    MOBO board rocks - Standing 20x ea    with L LE on 2\" step  -   Ball roll  3'  On PF - seated  x   DL squat to chair 2x10   x   SLS 2x30\"   x   SLS with 3 way kick 2x10   x   Lat tband walks  2 laps     band around ankles  x   Calf raises  2x10   x   Other: IASTM via Hawk  to R PF, deep STM to R gastroc/soleus;  Hypervolt- fork attachment to gastroc/soleus     Specific Instructions for next treatment:     Treatment Charges: Mins Units   []  Modalities     [x]  Ther Exercise 25 2   [x]  Manual Therapy 20 1   []  Ther Activities     []  Aquatics     [x]  Vasocompression     []  Other     Total Treatment time 45 3       Assessment: [x] Progressing toward goals. Continued with manual as listed above with no pain reported post. Focused on functional strengthening this date with fair to good tolerance. Pt reports more foot and ankle fatigue after SLS activities. Ended with ball roll on PF with positive relief reported after. [] No change. [] Other:    [x] Patient presents with signs and symptoms consistent with plantar fascitis, including pain in calcaneus, TTP in plantar fascia as well as tightness in gastroc. Patient would benefit from skilled physical therapy services in order to: Decrease tightness in R gastroc and plantar fascia contributing to heel pain. Pt will also benefit from peroneal and foot intrinsic strengthening to improve arch support.         Goals:  STG: (to be met in 10 treatments)  1. ? Pain: Decrease pain levels to 4/10 at worst in R heel   2. ? ROM: Increase flexibility and AROM limitations throughout to equal bilat to reduce difficulty with ADLs, increase R DF PROM with knee ext to 20deg  3. ? Strength: Increase R gastroc strenght to perform SL heel raise 10x  4. ? Function: Pt to report completing a full work day without any increase in pain   5. Independent with Home Exercise Programs  6. Demonstrate Knowledge of fall prevention     LTG: (to be met in 20 treatments)  1. Improve score on assessment tool from 32% impaired to 15% impaired, demonstrating an improvement in ADLs  2. Reduce pain levels to 0/10    Pt. Education:  [x] Yes  [] No  [] Reviewed Prior HEP/Ed  Method of Education: [x] Verbal  [x] Demo  [] Written  Comprehension of Education:  [x] Verbalizes understanding. [x] Demonstrates understanding.   [] Needs review. [] Demonstrates/verbalizes HEP/Ed previously given. Plan: [x] Continue current frequency toward long and short term goals.           Time In: 8:00am            Time Out: 8:45am    Electronically signed by:  Astrid Jang PTA

## 2022-05-06 ENCOUNTER — HOSPITAL ENCOUNTER (OUTPATIENT)
Dept: PHYSICAL THERAPY | Facility: CLINIC | Age: 47
Setting detail: THERAPIES SERIES
Discharge: HOME | End: 2022-05-06
Payer: COMMERCIAL

## 2022-05-06 PROCEDURE — 97140 MANUAL THERAPY 1/> REGIONS: CPT

## 2022-05-06 PROCEDURE — 97110 THERAPEUTIC EXERCISES: CPT

## 2022-05-06 NOTE — FLOWSHEET NOTE
[] [address]  [address]  [phone]  F: [phone] [] [address]  [facility]   [address]  P: [phone]  F: [phone] [] [address]  Therapy  [address] [facility]  P: [phone]  F: [phone] [x] [address]  P: [phone]  F: [phone] [] [address]  [facility]   [address]   [place]: [phone]  F: [phone]      Physical Therapy Daily Treatment Note    Date:  2022  Patient Name:  Cristiane Good    :  1975  MRN: [id number]  Physician: Dr. Ferny Gray: Sanaz ROMERO after 25 visits   Medical Diagnosis: Plantar fascitis              Rehab Codes: M72. 2.  Onset date: ~22               Next 's appt.: TBA  Visit# / total visits:      Cancels/No Shows: 1/0    Subjective:    Pain:  [x] Yes  [] No Location: R heel plantar surface  Pain Rating: (0-10 scale) 1/10  Pain altered Tx:  [] No  [x] Yes  Action:  Comments: Pt arrives with decreased pain d/t not working last night. States to purchasing new shoes that are arriving tomorrow, and is hopefull about trying new shoes in combination with new orthotics. Objective:   Todays Treatment:     Exercises:  Exercise  R plantar fascitis     Reps/ Time Weight/ Level Comments    Bike  5'   x              Giuliana gastroc str 3x30\"     x   Foot Dome- Standing  10x 10\"   -   Toe Yoga- Standing 2x10     -   Step gastroc stretch 3x30\"     x    SB stretch 3x30\"    gastroc and soleus  x    MOBO board rocks - Standing 20x ea    with L LE on 2\" step  -   Ball roll  3'  On PF - seated  x   DL squat to chair 2x10   x   SLS 2x30\"   x   SLS with 3 way kick 2x10   x   Lat tband walks  2 laps     band around ankles  x Calf raises  2x10   x   Other: IASTM via Hawk  to R PF, deep STM to R gastroc/soleus; Hypervolt- fork attachment to gastroc/soleus     Specific Instructions for next treatment:     Treatment Charges: Mins Units   []  Modalities     [x]  Ther Exercise 25 2   [x]  Manual Therapy 20 1   []  Ther Activities     []  Aquatics     [x]  Vasocompression     []  Other     Total Treatment time 45 3       Assessment: [x] Progressing toward goals. Continued with manual as listed above with no pain reported post. Also continued to focus on hip and peroneal strengthening with good tolerance. Plan on one additional visit next week to monitor how foot feels with new shoes and orthotics. [] No change. [] Other:    [x] Patient presents with signs and symptoms consistent with plantar fascitis, including pain in calcaneus, TTP in plantar fascia as well as tightness in gastroc. Patient would benefit from skilled physical therapy services in order to: Decrease tightness in R gastroc and plantar fascia contributing to heel pain. Pt will also benefit from peroneal and foot intrinsic strengthening to improve arch support.         Goals:  STG: (to be met in 10 treatments)  1. ? Pain: Decrease pain levels to 4/10 at worst in R heel   2. ? ROM: Increase flexibility and AROM limitations throughout to equal bilat to reduce difficulty with ADLs, increase R DF PROM with knee ext to 20deg  3. ? Strength: Increase R gastroc strenght to perform SL heel raise 10x  4. ? Function: Pt to report completing a full work day without any increase in pain   5. Independent with Home Exercise Programs  6. Demonstrate Knowledge of fall prevention     LTG: (to be met in 20 treatments)  1. Improve score on assessment tool from 32% impaired to 15% impaired, demonstrating an improvement in ADLs  2. Reduce pain levels to 0/10    Pt.  Education:  [x] Yes  [] No  [] Reviewed Prior HEP/Ed  Method of Education: [x] Verbal  [x] Demo  [] Written  Comprehension of Education:  [x] Verbalizes understanding. [x] Demonstrates understanding. [] Needs review. [] Demonstrates/verbalizes HEP/Ed previously given. Plan: [x] Continue current frequency toward long and short term goals.           Time In: 8:00am            Time Out: 8:40am    Electronically signed by:  Quynh Cho PT

## 2022-05-12 ENCOUNTER — HOSPITAL ENCOUNTER (OUTPATIENT)
Dept: PHYSICAL THERAPY | Facility: CLINIC | Age: 47
Setting detail: THERAPIES SERIES
Discharge: HOME | End: 2022-05-12
Payer: COMMERCIAL

## 2022-05-12 PROCEDURE — 97140 MANUAL THERAPY 1/> REGIONS: CPT

## 2022-05-12 PROCEDURE — 97110 THERAPEUTIC EXERCISES: CPT

## 2022-05-12 NOTE — FLOWSHEET NOTE
[] [address]  [address]  [phone]  F: [phone] [] [address]  [facility] [address]   [address]  P: [phone]  F: [phone] [] [address]  Therapy  [address] [facility]  P: [phone]  F: [phone] [x] [address]  P: [phone]  F: [phone] [] [address]  [facility]   [address]   [place]: [phone]  F: [phone]      Physical Therapy Daily Treatment Note    Date:  2022  Patient Name:  Christophe Chin    :  1975  MRN: [id number]  Physician: Dr. Fofana Brood: Sanaz ROMERO after 25 visits   Medical Diagnosis: Plantar fascitis              Rehab Codes: M72. 2.  Onset date: ~22               Next 's appt.: TBA  Visit# / total visits:      Cancels/No Shows: 1/0    Subjective:    Pain:  [x] Yes  [] No Location: R heel plantar surface  Pain Rating: (0-10 scale) 1/10  Pain altered Tx:  [] No  [x] Yes  Action:  Comments: Pt reported that she is having very little pain, reporting she is only having pain while getting out of the car. Objective:   Todays Treatment:     Exercises:  Exercise  R plantar fascitis     Reps/ Time Weight/ Level Comments    Bike  5'   x              Giuliana gastroc str 3x30\"     x   Foot Dome- Standing  10x 10\"   -   Toe Yoga- Standing 2x10     -   Step gastroc stretch 3x30\"     x    SB stretch 3x30\"    gastroc and soleus  x    MOBO board rocks - Standing 20x ea    with L LE on 2\" step  x   Ball roll  3'  On PF - seated  -   DL squat to chair 2x10   x   SLS 2x30\"   x   SLS with 3 way kick 2x10   x   Lat tband walks  2 laps     band around ankles  x   Calf raises  2x10   x   Other: IASTM via Hawk  to R PF, deep STM to R gastroc/soleus; Hypervolt- fork attachment to gastroc/soleus     Specific Instructions for next treatment:     Treatment Charges: Mins Units   []  Modalities     [x]  Ther Exercise 26 2   [x]  Manual Therapy 20 1   []  Ther Activities     []  Aquatics     [x]  Vasocompression     []  Other     Total Treatment time 46 3       Assessment: [x] Progressing toward goals. Pt with good tolerance to activities with focus on technique due to possible discharge. Pt received manual as listed above. Pt completed LEFS with score of 96.25% functional.     [] No change. [] Other:    [x] Patient presents with signs and symptoms consistent with plantar fascitis, including pain in calcaneus, TTP in plantar fascia as well as tightness in gastroc. Patient would benefit from skilled physical therapy services in order to: Decrease tightness in R gastroc and plantar fascia contributing to heel pain. Pt will also benefit from peroneal and foot intrinsic strengthening to improve arch support.         Goals:  STG: (to be met in 10 treatments)  1. ? Pain: Decrease pain levels to 4/10 at worst in R heel MET  2. ? ROM: Increase flexibility and AROM limitations throughout to equal bilat to reduce difficulty with ADLs, increase R DF PROM with knee ext to 20deg MET  3. ? Strength: Increase R gastroc strenght to perform SL heel raise 10x MET  4. ? Function: Pt to report completing a full work day without any increase in pain MET  5. Independent with Home Exercise Programs MET  6. Demonstrate Knowledge of fall prevention MET     LTG: (to be met in 20 treatments)  1. Improve score on assessment tool from 32% impaired to 15% impaired, demonstrating an improvement in ADLs  2. Reduce pain levels to 0/10 MET    Pt. Education:  [x] Yes  [] No  [] Reviewed Prior HEP/Ed  Method of Education: [x] Verbal  [x] Demo  [] Written  Comprehension of Education:  [x] Verbalizes understanding. [x] Demonstrates understanding. [] Needs review.   [] Demonstrates/verbalizes HEP/Ed previously given. Plan: [x] Continue current frequency toward long and short term goals.           Time In: 0800            Time Out: 1174    Electronically signed by:  Claus Alex PTA

## 2023-11-27 SDOH — ECONOMIC STABILITY: INCOME INSECURITY: HOW HARD IS IT FOR YOU TO PAY FOR THE VERY BASICS LIKE FOOD, HOUSING, MEDICAL CARE, AND HEATING?: PATIENT DECLINED

## 2023-11-27 SDOH — ECONOMIC STABILITY: FOOD INSECURITY: WITHIN THE PAST 12 MONTHS, THE FOOD YOU BOUGHT JUST DIDN'T LAST AND YOU DIDN'T HAVE MONEY TO GET MORE.: PATIENT DECLINED

## 2023-11-27 SDOH — ECONOMIC STABILITY: TRANSPORTATION INSECURITY
IN THE PAST 12 MONTHS, HAS LACK OF TRANSPORTATION KEPT YOU FROM MEETINGS, WORK, OR FROM GETTING THINGS NEEDED FOR DAILY LIVING?: NO

## 2023-11-27 SDOH — ECONOMIC STABILITY: FOOD INSECURITY: WITHIN THE PAST 12 MONTHS, YOU WORRIED THAT YOUR FOOD WOULD RUN OUT BEFORE YOU GOT MONEY TO BUY MORE.: PATIENT DECLINED

## 2023-11-27 SDOH — ECONOMIC STABILITY: HOUSING INSECURITY
IN THE LAST 12 MONTHS, WAS THERE A TIME WHEN YOU DID NOT HAVE A STEADY PLACE TO SLEEP OR SLEPT IN A SHELTER (INCLUDING NOW)?: PATIENT REFUSED

## 2023-11-28 ENCOUNTER — OFFICE VISIT (OUTPATIENT)
Age: 48
End: 2023-11-28
Payer: COMMERCIAL

## 2023-11-28 VITALS
SYSTOLIC BLOOD PRESSURE: 124 MMHG | DIASTOLIC BLOOD PRESSURE: 80 MMHG | HEIGHT: 63 IN | BODY MASS INDEX: 34.3 KG/M2 | WEIGHT: 193.6 LBS | RESPIRATION RATE: 13 BRPM | TEMPERATURE: 98.1 F | HEART RATE: 76 BPM

## 2023-11-28 DIAGNOSIS — H66.002 NON-RECURRENT ACUTE SUPPURATIVE OTITIS MEDIA OF LEFT EAR WITHOUT SPONTANEOUS RUPTURE OF TYMPANIC MEMBRANE: Primary | ICD-10-CM

## 2023-11-28 PROCEDURE — G8417 CALC BMI ABV UP PARAM F/U: HCPCS | Performed by: FAMILY MEDICINE

## 2023-11-28 PROCEDURE — G8484 FLU IMMUNIZE NO ADMIN: HCPCS | Performed by: FAMILY MEDICINE

## 2023-11-28 PROCEDURE — 99213 OFFICE O/P EST LOW 20 MIN: CPT | Performed by: FAMILY MEDICINE

## 2023-11-28 PROCEDURE — G8427 DOCREV CUR MEDS BY ELIG CLIN: HCPCS | Performed by: FAMILY MEDICINE

## 2023-11-28 PROCEDURE — 1036F TOBACCO NON-USER: CPT | Performed by: FAMILY MEDICINE

## 2023-11-28 RX ORDER — VENLAFAXINE HYDROCHLORIDE 75 MG/1
CAPSULE, EXTENDED RELEASE ORAL
COMMUNITY
Start: 2023-11-10

## 2023-11-28 RX ORDER — AZITHROMYCIN 250 MG/1
250 TABLET, FILM COATED ORAL SEE ADMIN INSTRUCTIONS
Qty: 6 TABLET | Refills: 0 | Status: SHIPPED | OUTPATIENT
Start: 2023-11-28 | End: 2023-12-03

## 2023-11-28 ASSESSMENT — PATIENT HEALTH QUESTIONNAIRE - PHQ9
SUM OF ALL RESPONSES TO PHQ9 QUESTIONS 1 & 2: 0
3. TROUBLE FALLING OR STAYING ASLEEP: 0
SUM OF ALL RESPONSES TO PHQ QUESTIONS 1-9: 0
6. FEELING BAD ABOUT YOURSELF - OR THAT YOU ARE A FAILURE OR HAVE LET YOURSELF OR YOUR FAMILY DOWN: 0
SUM OF ALL RESPONSES TO PHQ QUESTIONS 1-9: 0
9. THOUGHTS THAT YOU WOULD BE BETTER OFF DEAD, OR OF HURTING YOURSELF: 0
2. FEELING DOWN, DEPRESSED OR HOPELESS: 0
5. POOR APPETITE OR OVEREATING: 0
SUM OF ALL RESPONSES TO PHQ QUESTIONS 1-9: 0
10. IF YOU CHECKED OFF ANY PROBLEMS, HOW DIFFICULT HAVE THESE PROBLEMS MADE IT FOR YOU TO DO YOUR WORK, TAKE CARE OF THINGS AT HOME, OR GET ALONG WITH OTHER PEOPLE: 0
SUM OF ALL RESPONSES TO PHQ QUESTIONS 1-9: 0
1. LITTLE INTEREST OR PLEASURE IN DOING THINGS: 0
7. TROUBLE CONCENTRATING ON THINGS, SUCH AS READING THE NEWSPAPER OR WATCHING TELEVISION: 0
8. MOVING OR SPEAKING SO SLOWLY THAT OTHER PEOPLE COULD HAVE NOTICED. OR THE OPPOSITE, BEING SO FIGETY OR RESTLESS THAT YOU HAVE BEEN MOVING AROUND A LOT MORE THAN USUAL: 0
4. FEELING TIRED OR HAVING LITTLE ENERGY: 0

## 2023-12-08 NOTE — TELEPHONE ENCOUNTER
Bekah Hope is calling to request a refill on the following medication(s):    Medication Request:  Requested Prescriptions     Pending Prescriptions Disp Refills    diclofenac (VOLTAREN) 50 MG EC tablet [Pharmacy Med Name: DICLOFENAC SOD EC 50 MG TAB] 60 tablet      Sig: TAKE ONE TABLET BY MOUTH TWICE A DAY AS NEEDED       Last Visit Date (If Applicable):  Visit date not found    Next Visit Date:    Visit date not found

## 2024-03-18 NOTE — TELEPHONE ENCOUNTER
Prerna Denson is calling to request a refill on the following medication(s):    Medication Request:  Requested Prescriptions     Pending Prescriptions Disp Refills    diclofenac (VOLTAREN) 50 MG EC tablet [Pharmacy Med Name: DICLOFENAC SOD EC 50 MG TAB] 60 tablet 1     Sig: TAKE 1 TABLET BY MOUTH TWICE A DAY AS NEEDED       Last Visit Date (If Applicable):  Visit date not found    Next Visit Date:    Visit date not found

## 2024-04-24 ENCOUNTER — OFFICE VISIT (OUTPATIENT)
Age: 49
End: 2024-04-24
Payer: COMMERCIAL

## 2024-04-24 VITALS
SYSTOLIC BLOOD PRESSURE: 128 MMHG | BODY MASS INDEX: 34.16 KG/M2 | WEIGHT: 192.8 LBS | DIASTOLIC BLOOD PRESSURE: 82 MMHG | TEMPERATURE: 98.6 F | HEART RATE: 78 BPM | RESPIRATION RATE: 18 BRPM | OXYGEN SATURATION: 100 % | HEIGHT: 63 IN

## 2024-04-24 DIAGNOSIS — R63.5 WEIGHT GAIN: ICD-10-CM

## 2024-04-24 DIAGNOSIS — K51.90 ULCERATIVE COLITIS IN REMISSION (HCC): Primary | ICD-10-CM

## 2024-04-24 PROCEDURE — 99213 OFFICE O/P EST LOW 20 MIN: CPT | Performed by: FAMILY MEDICINE

## 2024-04-24 PROCEDURE — G8427 DOCREV CUR MEDS BY ELIG CLIN: HCPCS | Performed by: FAMILY MEDICINE

## 2024-04-24 PROCEDURE — G8417 CALC BMI ABV UP PARAM F/U: HCPCS | Performed by: FAMILY MEDICINE

## 2024-04-24 PROCEDURE — 1036F TOBACCO NON-USER: CPT | Performed by: FAMILY MEDICINE

## 2024-04-24 RX ORDER — PHENTERMINE HYDROCHLORIDE 37.5 MG/1
37.5 TABLET ORAL
Qty: 30 TABLET | Refills: 0 | Status: SHIPPED | OUTPATIENT
Start: 2024-04-24 | End: 2024-05-24

## 2024-04-24 ASSESSMENT — PATIENT HEALTH QUESTIONNAIRE - PHQ9
4. FEELING TIRED OR HAVING LITTLE ENERGY: NOT AT ALL
SUM OF ALL RESPONSES TO PHQ QUESTIONS 1-9: 0
3. TROUBLE FALLING OR STAYING ASLEEP: NOT AT ALL
SUM OF ALL RESPONSES TO PHQ9 QUESTIONS 1 & 2: 0
SUM OF ALL RESPONSES TO PHQ QUESTIONS 1-9: 0
9. THOUGHTS THAT YOU WOULD BE BETTER OFF DEAD, OR OF HURTING YOURSELF: NOT AT ALL
SUM OF ALL RESPONSES TO PHQ QUESTIONS 1-9: 0
1. LITTLE INTEREST OR PLEASURE IN DOING THINGS: NOT AT ALL
5. POOR APPETITE OR OVEREATING: NOT AT ALL
6. FEELING BAD ABOUT YOURSELF - OR THAT YOU ARE A FAILURE OR HAVE LET YOURSELF OR YOUR FAMILY DOWN: NOT AT ALL
10. IF YOU CHECKED OFF ANY PROBLEMS, HOW DIFFICULT HAVE THESE PROBLEMS MADE IT FOR YOU TO DO YOUR WORK, TAKE CARE OF THINGS AT HOME, OR GET ALONG WITH OTHER PEOPLE: NOT DIFFICULT AT ALL
SUM OF ALL RESPONSES TO PHQ QUESTIONS 1-9: 0
8. MOVING OR SPEAKING SO SLOWLY THAT OTHER PEOPLE COULD HAVE NOTICED. OR THE OPPOSITE, BEING SO FIGETY OR RESTLESS THAT YOU HAVE BEEN MOVING AROUND A LOT MORE THAN USUAL: NOT AT ALL
7. TROUBLE CONCENTRATING ON THINGS, SUCH AS READING THE NEWSPAPER OR WATCHING TELEVISION: NOT AT ALL
2. FEELING DOWN, DEPRESSED OR HOPELESS: NOT AT ALL

## 2024-04-24 NOTE — PROGRESS NOTES
[facility]     Date of Visit:  2024  Patient Name: Prerna Denson   Patient :  1975     CHIEF COMPLAINT/HPI:     Prerna Denson is a 48 y.o. female who presents today for an general visit to be evaluated for the following condition(s):  Chief Complaint   Patient presents with    Weight Loss     Patient is here to discuss weight loss   Patient would also like lab orders      Patient has been gaining weight her BMI is over 34.  She does have ulcerative colitis that is in remission and occasionally flares up with stress.  REVIEW OF SYSTEM      Review of Systems    Patient has no other healthcare issues.  No fever no sweats no chills. No chest pain nor shortness of breath. No nausea nor vomiting no diarrhea . No  complaints.  No edema issues.    REVIEWED INFORMATION      Allergies   Allergen Reactions    Amoxicillin Rash     Other reaction(s): AOF, Unknown       Current Outpatient Medications   Medication Sig Dispense Refill    phentermine (ADIPEX-P) 37.5 MG tablet Take 1 tablet by mouth every morning (before breakfast) for 30 days. Max Daily Amount: 37.5 mg 30 tablet 0    diclofenac (VOLTAREN) 50 MG EC tablet TAKE 1 TABLET BY MOUTH TWICE A DAY AS NEEDED 60 tablet 1    venlafaxine (EFFEXOR XR) 75 MG extended release capsule Take 1 capsule by mouth daily       No current facility-administered medications for this visit.        Patient Active Problem List   Diagnosis    Hemorrhoids    Rectal bleed    Anxiety and depression    Chronic pain of right ankle    Ulcerative colitis in remission (HCC)    Weight gain       Past Medical History:   Diagnosis Date    Anxiety     Blood in stool     \"every time I go\", worse the past couple of months     Dental crowns present     Depression     Diarrhea     SOMETIMES MUCUS/BLOOD    Hemorrhoids     Tendon pain        Past Surgical History:   Procedure Laterality Date     SECTION      x2    COLONOSCOPY   approx    [facility]    COLONOSCOPY  2017

## 2024-04-26 ENCOUNTER — HOSPITAL ENCOUNTER (OUTPATIENT)
Age: 49
Discharge: HOME | End: 2024-04-26
Payer: COMMERCIAL

## 2024-04-26 DIAGNOSIS — K51.90 ULCERATIVE COLITIS IN REMISSION (HCC): ICD-10-CM

## 2024-04-26 DIAGNOSIS — R63.5 WEIGHT GAIN: ICD-10-CM

## 2024-04-26 LAB
ALBUMIN SERPL-MCNC: 3.6 G/DL (ref 3.5–5.2)
ALBUMIN/GLOB SERPL: 2 {RATIO} (ref 1–2.5)
ALP SERPL-CCNC: 68 U/L (ref 35–104)
ALT SERPL-CCNC: 12 U/L (ref 10–35)
ANION GAP SERPL CALCULATED.3IONS-SCNC: 8 MMOL/L (ref 9–16)
AST SERPL-CCNC: 18 U/L (ref 10–35)
BILIRUB DIRECT SERPL-MCNC: <0.2 MG/DL (ref 0–0.3)
BILIRUB INDIRECT SERPL-MCNC: 0.2 MG/DL (ref 0–1)
BILIRUB SERPL-MCNC: 0.3 MG/DL (ref 0–1.2)
BUN SERPL-MCNC: 12 MG/DL (ref 6–20)
CALCIUM SERPL-MCNC: 8.3 MG/DL (ref 8.6–10.4)
CHLORIDE SERPL-SCNC: 103 MMOL/L (ref 98–107)
CHOLEST SERPL-MCNC: 173 MG/DL (ref 0–199)
CHOLESTEROL/HDL RATIO: 3
CO2 SERPL-SCNC: 28 MMOL/L (ref 20–31)
CREAT SERPL-MCNC: 0.7 MG/DL (ref 0.5–0.9)
ERYTHROCYTE [DISTWIDTH] IN BLOOD BY AUTOMATED COUNT: 16.6 % (ref 11.8–14.4)
GFR SERPL CREATININE-BSD FRML MDRD: >90 ML/MIN/1.73M2
GLUCOSE SERPL-MCNC: 89 MG/DL (ref 74–99)
HCT VFR BLD AUTO: 40.3 % (ref 36.3–47.1)
HDLC SERPL-MCNC: 53 MG/DL
HGB BLD-MCNC: 12.7 G/DL (ref 11.9–15.1)
LDLC SERPL CALC-MCNC: 86 MG/DL (ref 0–100)
MCH RBC QN AUTO: 26.3 PG (ref 25.2–33.5)
MCHC RBC AUTO-ENTMCNC: 31.5 G/DL (ref 28.4–34.8)
MCV RBC AUTO: 83.6 FL (ref 82.6–102.9)
NRBC BLD-RTO: 0 PER 100 WBC
PLATELET # BLD AUTO: 319 K/UL (ref 138–453)
PMV BLD AUTO: 9.8 FL (ref 8.1–13.5)
POTASSIUM SERPL-SCNC: 4.2 MMOL/L (ref 3.7–5.3)
PROT SERPL-MCNC: 5.6 G/DL (ref 6.6–8.7)
RBC # BLD AUTO: 4.82 M/UL (ref 3.95–5.11)
SODIUM SERPL-SCNC: 139 MMOL/L (ref 136–145)
TRIGL SERPL-MCNC: 169 MG/DL
TSH SERPL DL<=0.05 MIU/L-ACNC: 1.53 UIU/ML (ref 0.27–4.2)
VLDLC SERPL CALC-MCNC: 34 MG/DL
WBC OTHER # BLD: 12.8 K/UL (ref 3.5–11.3)

## 2024-04-26 PROCEDURE — 36415 COLL VENOUS BLD VENIPUNCTURE: CPT

## 2024-04-26 PROCEDURE — 84443 ASSAY THYROID STIM HORMONE: CPT

## 2024-04-26 PROCEDURE — 85027 COMPLETE CBC AUTOMATED: CPT

## 2024-04-26 PROCEDURE — 80061 LIPID PANEL: CPT

## 2024-04-26 PROCEDURE — 82248 BILIRUBIN DIRECT: CPT

## 2024-04-26 PROCEDURE — 80053 COMPREHEN METABOLIC PANEL: CPT

## 2024-04-27 NOTE — RESULT ENCOUNTER NOTE
Please call patient labs were good except white count was slightly elevated which usually means some inflammation going on   repeat CBC 1 week

## 2024-05-01 ENCOUNTER — HOSPITAL ENCOUNTER (OUTPATIENT)
Age: 49
Discharge: HOME | End: 2024-05-01
Payer: COMMERCIAL

## 2024-05-01 DIAGNOSIS — K51.90 ULCERATIVE COLITIS IN REMISSION (HCC): ICD-10-CM

## 2024-05-01 LAB
ERYTHROCYTE [DISTWIDTH] IN BLOOD BY AUTOMATED COUNT: 16.5 % (ref 11.8–14.4)
HCT VFR BLD AUTO: 38.1 % (ref 36.3–47.1)
HGB BLD-MCNC: 12.3 G/DL (ref 11.9–15.1)
MCH RBC QN AUTO: 26.9 PG (ref 25.2–33.5)
MCHC RBC AUTO-ENTMCNC: 32.3 G/DL (ref 28.4–34.8)
MCV RBC AUTO: 83.4 FL (ref 82.6–102.9)
NRBC BLD-RTO: 0 PER 100 WBC
PLATELET # BLD AUTO: 339 K/UL (ref 138–453)
PMV BLD AUTO: 9.6 FL (ref 8.1–13.5)
RBC # BLD AUTO: 4.57 M/UL (ref 3.95–5.11)
WBC OTHER # BLD: 14.1 K/UL (ref 3.5–11.3)

## 2024-05-01 PROCEDURE — 85027 COMPLETE CBC AUTOMATED: CPT

## 2024-05-01 PROCEDURE — 36415 COLL VENOUS BLD VENIPUNCTURE: CPT

## 2024-05-24 ENCOUNTER — OFFICE VISIT (OUTPATIENT)
Age: 49
End: 2024-05-24
Payer: COMMERCIAL

## 2024-05-24 VITALS
OXYGEN SATURATION: 98 % | BODY MASS INDEX: 32.6 KG/M2 | HEIGHT: 63 IN | SYSTOLIC BLOOD PRESSURE: 130 MMHG | DIASTOLIC BLOOD PRESSURE: 80 MMHG | WEIGHT: 184 LBS | HEART RATE: 84 BPM | RESPIRATION RATE: 14 BRPM

## 2024-05-24 DIAGNOSIS — K51.90 ULCERATIVE COLITIS IN REMISSION (HCC): Primary | ICD-10-CM

## 2024-05-24 DIAGNOSIS — R63.5 WEIGHT GAIN: ICD-10-CM

## 2024-05-24 PROBLEM — F39 UNSPECIFIED MOOD (AFFECTIVE) DISORDER (HCC): Status: ACTIVE | Noted: 2023-12-20

## 2024-05-24 PROBLEM — N62 HYPERTROPHY OF BREAST: Status: ACTIVE | Noted: 2017-12-05

## 2024-05-24 PROBLEM — K51.30 ULCERATIVE RECTOSIGMOIDITIS WITHOUT COMPLICATION (HCC): Status: ACTIVE | Noted: 2024-05-24

## 2024-05-24 PROCEDURE — G8417 CALC BMI ABV UP PARAM F/U: HCPCS | Performed by: FAMILY MEDICINE

## 2024-05-24 PROCEDURE — 99213 OFFICE O/P EST LOW 20 MIN: CPT | Performed by: FAMILY MEDICINE

## 2024-05-24 PROCEDURE — G8427 DOCREV CUR MEDS BY ELIG CLIN: HCPCS | Performed by: FAMILY MEDICINE

## 2024-05-24 PROCEDURE — 1036F TOBACCO NON-USER: CPT | Performed by: FAMILY MEDICINE

## 2024-05-24 RX ORDER — PHENTERMINE HYDROCHLORIDE 37.5 MG/1
37.5 TABLET ORAL
Qty: 30 TABLET | Refills: 0 | Status: SHIPPED | OUTPATIENT
Start: 2024-05-24 | End: 2024-06-23

## 2024-05-24 NOTE — PROGRESS NOTES
[facility]     Date of Visit:  2024  Patient Name: Prerna Denson   Patient :  1975     CHIEF COMPLAINT/HPI:     Prerna Denson is a 48 y.o. female who presents today for an general visit to be evaluated for the following condition(s):  Chief Complaint   Patient presents with    Annual Exam    Discuss Labs    Discuss Medications       Patient had very successful weight loss.  But on her laboratory studies she had elevated white count on 2 occasions for no reason.  She did not have any illnesses going on she was not taking any steroids no flareup of her colitis.  Lab Results   Component Value Date/Time     2024 10:00 AM    K 4.2 2024 10:00 AM     2024 10:00 AM    CO2 28 2024 10:00 AM    BUN 12 2024 10:00 AM    CREATININE 0.7 2024 10:00 AM    GLUCOSE 89 2024 10:00 AM    CALCIUM 8.3 2024 10:00 AM    LABGLOM >90 2024 10:00 AM        No results found for: \"MALBCR\"     Lab Results   Component Value Date    CHOL 173 2024    TRIG 169 (H) 2024    HDL 53 2024    VLDL 34 2024    CHOLHDLRATIO 3.0 2024        Lab Results   Component Value Date    WBC 14.1 (H) 2024    HGB 12.3 2024    HCT 38.1 2024    MCV 83.4 2024     2024       Lab Results   Component Value Date    ALT 12 2024    AST 18 2024    ALKPHOS 68 2024    BILITOT 0.3 2024        Lab Results   Component Value Date    TSH 1.53 2024        REVIEW OF SYSTEM      Review of Systems  Patient feels finePatient has no other healthcare issues.  No fever no sweats no chills. No chest pain nor shortness of breath. No nausea nor vomiting no diarrhea . No  complaints.  No edema issues.      REVIEWED INFORMATION      Allergies   Allergen Reactions    Amoxicillin Rash     Other reaction(s): AOF, Unknown       Current Outpatient Medications   Medication Sig Dispense Refill    phentermine

## 2024-06-25 ENCOUNTER — NURSE ONLY (OUTPATIENT)
Age: 49
End: 2024-06-25

## 2024-06-25 VITALS — SYSTOLIC BLOOD PRESSURE: 130 MMHG | WEIGHT: 177 LBS | BODY MASS INDEX: 31.35 KG/M2 | DIASTOLIC BLOOD PRESSURE: 80 MMHG

## 2024-06-25 DIAGNOSIS — R63.5 WEIGHT GAIN: Primary | ICD-10-CM

## 2024-06-25 RX ORDER — PHENTERMINE HYDROCHLORIDE 37.5 MG/1
37.5 TABLET ORAL
Qty: 30 TABLET | Refills: 0 | Status: SHIPPED | OUTPATIENT
Start: 2024-06-25 | End: 2024-07-25

## 2024-06-25 NOTE — PROGRESS NOTES
Patient is here for NV for bp and weight check for medication Adipex. Patient states no negative side effects, helps curve appetite.   Patients bp and weight today was   /80   Wt 80.3 kg (177 lb)   BMI 31.35 kg/m²  . patient has lost 7  Approved by Corby Jesus MD  Patient needs a refill of Adipex

## 2024-07-18 NOTE — TELEPHONE ENCOUNTER
Prerna Denson is calling to request a refill on the following medication(s):    Medication Request:  Requested Prescriptions     Pending Prescriptions Disp Refills    diclofenac (VOLTAREN) 50 MG EC tablet [Pharmacy Med Name: DICLOFENAC SOD EC 50 MG TAB] 60 tablet 1     Sig: TAKE 1 TABLET BY MOUTH TWICE A DAY AS NEEDED       Last Visit Date (If Applicable):  5/24/2024    Next Visit Date:    7/24/2024

## 2024-07-24 ENCOUNTER — OFFICE VISIT (OUTPATIENT)
Age: 49
End: 2024-07-24
Payer: COMMERCIAL

## 2024-07-24 VITALS
SYSTOLIC BLOOD PRESSURE: 130 MMHG | HEART RATE: 77 BPM | DIASTOLIC BLOOD PRESSURE: 88 MMHG | OXYGEN SATURATION: 99 % | WEIGHT: 182 LBS | TEMPERATURE: 97.6 F | BODY MASS INDEX: 32.25 KG/M2 | HEIGHT: 63 IN

## 2024-07-24 DIAGNOSIS — R63.5 WEIGHT GAIN: ICD-10-CM

## 2024-07-24 DIAGNOSIS — K04.7 DENTAL INFECTION: ICD-10-CM

## 2024-07-24 DIAGNOSIS — M10.072 ACUTE IDIOPATHIC GOUT INVOLVING TOE OF LEFT FOOT: Primary | ICD-10-CM

## 2024-07-24 PROCEDURE — 99213 OFFICE O/P EST LOW 20 MIN: CPT | Performed by: FAMILY MEDICINE

## 2024-07-24 PROCEDURE — 1036F TOBACCO NON-USER: CPT | Performed by: FAMILY MEDICINE

## 2024-07-24 PROCEDURE — G8427 DOCREV CUR MEDS BY ELIG CLIN: HCPCS | Performed by: FAMILY MEDICINE

## 2024-07-24 PROCEDURE — G8417 CALC BMI ABV UP PARAM F/U: HCPCS | Performed by: FAMILY MEDICINE

## 2024-07-24 RX ORDER — PHENTERMINE HYDROCHLORIDE 37.5 MG/1
37.5 TABLET ORAL
Qty: 30 TABLET | Refills: 0 | Status: SHIPPED | OUTPATIENT
Start: 2024-07-24 | End: 2024-08-23

## 2024-07-24 RX ORDER — DOXYCYCLINE HYCLATE 100 MG
100 TABLET ORAL 2 TIMES DAILY
Qty: 20 TABLET | Refills: 0 | Status: SHIPPED | OUTPATIENT
Start: 2024-07-24 | End: 2024-08-03

## 2024-07-24 NOTE — PROGRESS NOTES
[facility]     Date of Visit:  2024  Patient Name: Prerna Denson   Patient :  1975     CHIEF COMPLAINT/HPI:     Prerna Denson is a 48 y.o. female who presents today for an general visit to be evaluated for the following condition(s):  Chief Complaint   Patient presents with    Weight Management     She is here for her adipex check up.  Last months weight was 177 and today she is 182.  She is not having any problems with the medication.  She would like you to look at her sinuses.  She is not sure if it is that or a tooth problem. .     Patient recently had a flareup of gout was seen by podiatry she did have steroid injection.  She did not get repeat CBC because of the gout and steroids.  Also she has developed dental abscess of the left eye tooth.  She has yet to see dentist she will be started on antibiotics.  REVIEW OF SYSTEM      Review of Systems  No fever no sweats no chills intermittent left dental pain of the eye tooth.  No sore throat no swollen glands gout has resolved she is having her menses and there is some fluid retention    REVIEWED INFORMATION      Allergies   Allergen Reactions    Amoxicillin Rash     Other reaction(s): AOF, Unknown       Current Outpatient Medications   Medication Sig Dispense Refill    doxycycline hyclate (VIBRA-TABS) 100 MG tablet Take 1 tablet by mouth 2 times daily for 10 days 20 tablet 0    phentermine (ADIPEX-P) 37.5 MG tablet Take 1 tablet by mouth every morning (before breakfast) for 30 days. Max Daily Amount: 37.5 mg 30 tablet 0    diclofenac (VOLTAREN) 50 MG EC tablet TAKE 1 TABLET BY MOUTH TWICE A DAY AS NEEDED 60 tablet 1    venlafaxine (EFFEXOR XR) 75 MG extended release capsule Take 1 capsule by mouth daily       No current facility-administered medications for this visit.        Patient Active Problem List   Diagnosis    Hemorrhoids    Rectal bleed    Anxiety and depression    Chronic pain of right ankle    Ulcerative colitis in remission

## 2024-07-24 NOTE — PATIENT INSTRUCTIONS
Prerna    Thank you for choosing [facility].  We know you have options when it comes to your healthcare; we appreciate that you chose us. Our goal is to provide exceptional  service and world class care to every patient.  You will be receiving a survey via email or text message asking for your feedback.  Please take a few minutes to share your thoughts about your recent visit. Your comments help us understand what we do well and ways we can improve.  Thank you in advance for your valuable feedback.      Dr. BONITA Escalante

## 2024-09-05 ENCOUNTER — OFFICE VISIT (OUTPATIENT)
Age: 49
End: 2024-09-05
Payer: COMMERCIAL

## 2024-09-05 VITALS
DIASTOLIC BLOOD PRESSURE: 80 MMHG | TEMPERATURE: 98.3 F | WEIGHT: 181 LBS | OXYGEN SATURATION: 98 % | BODY MASS INDEX: 32.07 KG/M2 | SYSTOLIC BLOOD PRESSURE: 130 MMHG | HEART RATE: 73 BPM | HEIGHT: 63 IN

## 2024-09-05 DIAGNOSIS — M19.071 OSTEOARTHRITIS OF BOTH ANKLES AND FEET: ICD-10-CM

## 2024-09-05 DIAGNOSIS — M19.072 OSTEOARTHRITIS OF BOTH ANKLES AND FEET: ICD-10-CM

## 2024-09-05 DIAGNOSIS — G89.29 CHRONIC PAIN OF RIGHT ANKLE: ICD-10-CM

## 2024-09-05 DIAGNOSIS — M25.571 CHRONIC PAIN OF RIGHT ANKLE: ICD-10-CM

## 2024-09-05 DIAGNOSIS — R63.5 WEIGHT GAIN: Primary | ICD-10-CM

## 2024-09-05 DIAGNOSIS — K51.90 ULCERATIVE COLITIS IN REMISSION (HCC): ICD-10-CM

## 2024-09-05 PROCEDURE — 1036F TOBACCO NON-USER: CPT | Performed by: FAMILY MEDICINE

## 2024-09-05 PROCEDURE — 99213 OFFICE O/P EST LOW 20 MIN: CPT | Performed by: FAMILY MEDICINE

## 2024-09-05 PROCEDURE — G8417 CALC BMI ABV UP PARAM F/U: HCPCS | Performed by: FAMILY MEDICINE

## 2024-09-05 PROCEDURE — G8427 DOCREV CUR MEDS BY ELIG CLIN: HCPCS | Performed by: FAMILY MEDICINE

## 2024-09-05 RX ORDER — SEMAGLUTIDE 0.25 MG/.5ML
0.25 INJECTION, SOLUTION SUBCUTANEOUS
Qty: 2 ML | Refills: 1 | Status: SHIPPED | OUTPATIENT
Start: 2024-09-05

## 2024-09-05 NOTE — PROGRESS NOTES
[facility]     Date of Visit:  2024  Patient Name: Prerna Denson   Patient :  1975     CHIEF COMPLAINT/HPI:     Prerna Denson is a 49 y.o. female who presents today for an general visit to be evaluated for the following condition(s):  Chief Complaint   Patient presents with    Medication Check     Patient is here for a check up on weight loss progress      Patient remains stable at current weight.  Adipex was not fantastically effective.  Turns out that Wegovy may be covered by her insurance  REVIEW OF SYSTEM      Review of Systems    Patient has no other healthcare issues.  No fever no sweats no chills. No chest pain nor shortness of breath. No nausea nor vomiting no diarrhea . No  complaints.  No edema issues.    REVIEWED INFORMATION      Allergies   Allergen Reactions    Amoxicillin Rash     Other reaction(s): AOF, Unknown       Current Outpatient Medications   Medication Sig Dispense Refill    Semaglutide-Weight Management (WEGOVY) 0.25 MG/0.5ML SOAJ SC injection Inject 0.25 mg into the skin every 7 days 2 mL 1    diclofenac (VOLTAREN) 50 MG EC tablet TAKE 1 TABLET BY MOUTH TWICE A DAY AS NEEDED 60 tablet 1    venlafaxine (EFFEXOR XR) 75 MG extended release capsule Take 1 capsule by mouth daily       No current facility-administered medications for this visit.        Patient Active Problem List   Diagnosis    Hemorrhoids    Rectal bleed    Anxiety and depression    Chronic pain of right ankle    Ulcerative colitis in remission (HCC)    Weight gain    Hypertrophy of breast    IBD (inflammatory bowel disease)    Ulcerative rectosigmoiditis without complication (HCC)    Unspecified mood (affective) disorder (HCC)    Dental infection    Acute idiopathic gout involving toe of left foot    Osteoarthritis of both ankles and feet       Past Medical History:   Diagnosis Date    Anxiety     Blood in stool     \"every time I go\", worse the past couple of months     Dental crowns present

## 2024-09-18 ENCOUNTER — TELEPHONE (OUTPATIENT)
Age: 49
End: 2024-09-18

## 2024-09-18 NOTE — TELEPHONE ENCOUNTER
Patient is upset that her PA for Wegovy was denied and her husbands was approved a few months ago.  Patient states that she was approved for it before but could not get it at the time due to availability of medication, and now that it is available it is getting denied.  I tried to resend the PA yesterday and sent ov notes but it still came back denied.  Denial letter was scanned into patients media for your review.

## 2024-10-31 ENCOUNTER — OFFICE VISIT (OUTPATIENT)
Age: 49
End: 2024-10-31

## 2024-10-31 VITALS
TEMPERATURE: 97.8 F | SYSTOLIC BLOOD PRESSURE: 110 MMHG | WEIGHT: 185.8 LBS | HEIGHT: 63 IN | OXYGEN SATURATION: 98 % | HEART RATE: 84 BPM | DIASTOLIC BLOOD PRESSURE: 78 MMHG | BODY MASS INDEX: 32.92 KG/M2

## 2024-10-31 DIAGNOSIS — E66.811 CLASS 1 OBESITY DUE TO EXCESS CALORIES WITH BODY MASS INDEX (BMI) OF 32.0 TO 32.9 IN ADULT, UNSPECIFIED WHETHER SERIOUS COMORBIDITY PRESENT: Primary | ICD-10-CM

## 2024-10-31 DIAGNOSIS — E66.09 CLASS 1 OBESITY DUE TO EXCESS CALORIES WITH BODY MASS INDEX (BMI) OF 32.0 TO 32.9 IN ADULT, UNSPECIFIED WHETHER SERIOUS COMORBIDITY PRESENT: Primary | ICD-10-CM

## 2024-10-31 DIAGNOSIS — K51.90 ULCERATIVE COLITIS IN REMISSION (HCC): ICD-10-CM

## 2024-10-31 DIAGNOSIS — R63.5 WEIGHT GAIN: ICD-10-CM

## 2024-10-31 RX ORDER — SEMAGLUTIDE 0.68 MG/ML
0.25 INJECTION, SOLUTION SUBCUTANEOUS
Qty: 3 ML | Refills: 0 | Status: SHIPPED | OUTPATIENT
Start: 2024-10-31

## 2024-10-31 NOTE — PATIENT INSTRUCTIONS
Prerna    Thank you for choosing [facility].  We know you have options when it comes to your healthcare; we appreciate that you chose us. Our goal is to provide exceptional  service and world class care to every patient.  You will be receiving a survey via email or text message asking for your feedback.  Please take a few minutes to share your thoughts about your recent visit. Your comments help us understand what we do well and ways we can improve.  Thank you in advance for your valuable feedback.      Dr. BONITA Escalante

## 2024-10-31 NOTE — PROGRESS NOTES
[facility]     Date of Visit:  10/31/2024  Patient Name: Prerna Denson   Patient :  1975     CHIEF COMPLAINT/HPI:     Prerna Denson is a 49 y.o. female who presents today for an general visit to be evaluated for the following condition(s):  Chief Complaint   Patient presents with    Weight Management     She is here to discuss other weight loss meds.       Patient wants to try GLP-1 agents again we will try Ozempic to see if this might be covered.  No family history of thyroid cancer nor personal history.  No history of multiple endocrine neoplasia  REVIEW OF SYSTEM      Review of Systems    Patient has no other healthcare issues.  No fever no sweats no chills. No chest pain nor shortness of breath. No nausea nor vomiting no diarrhea . No  complaints.  No edema issues.    REVIEWED INFORMATION      Allergies   Allergen Reactions    Amoxicillin Rash     Other reaction(s): AOF, Unknown    Pcn [Penicillins] Rash       Current Outpatient Medications   Medication Sig Dispense Refill    Semaglutide,0.25 or 0.5MG/DOS, (OZEMPIC, 0.25 OR 0.5 MG/DOSE,) 2 MG/3ML SOPN Inject 0.25 mg into the skin every 7 days 3 mL 0    diclofenac (VOLTAREN) 50 MG EC tablet TAKE 1 TABLET BY MOUTH TWICE A DAY AS NEEDED 60 tablet 1    venlafaxine (EFFEXOR XR) 75 MG extended release capsule Take 1 capsule by mouth daily      Semaglutide-Weight Management (WEGOVY) 0.25 MG/0.5ML SOAJ SC injection Inject 0.25 mg into the skin every 7 days 2 mL 1     No current facility-administered medications for this visit.        Patient Active Problem List   Diagnosis    Hemorrhoids    Rectal bleed    Anxiety and depression    Chronic pain of right ankle    Ulcerative colitis in remission (HCC)    Weight gain    Hypertrophy of breast    IBD (inflammatory bowel disease)    Ulcerative rectosigmoiditis without complication (HCC)    Unspecified mood (affective) disorder (HCC)    Dental infection    Acute idiopathic gout involving toe of left

## 2025-01-28 ENCOUNTER — HOSPITAL ENCOUNTER (OUTPATIENT)
Age: 50
Discharge: HOME | End: 2025-01-28
Payer: COMMERCIAL

## 2025-01-28 LAB
ERYTHROCYTE [DISTWIDTH] IN BLOOD BY AUTOMATED COUNT: 15 % (ref 11.8–14.4)
HCT VFR BLD AUTO: 30.6 % (ref 36.3–47.1)
HGB BLD-MCNC: 9.2 G/DL (ref 11.9–15.1)
MCH RBC QN AUTO: 24.5 PG (ref 25.2–33.5)
MCHC RBC AUTO-ENTMCNC: 30.1 G/DL (ref 28.4–34.8)
MCV RBC AUTO: 81.4 FL (ref 82.6–102.9)
NRBC BLD-RTO: 0 PER 100 WBC
PLATELET # BLD AUTO: 452 K/UL (ref 138–453)
PMV BLD AUTO: 9.8 FL (ref 8.1–13.5)
RBC # BLD AUTO: 3.76 M/UL (ref 3.95–5.11)
WBC OTHER # BLD: 13.9 K/UL (ref 3.5–11.3)

## 2025-01-28 PROCEDURE — 36415 COLL VENOUS BLD VENIPUNCTURE: CPT

## 2025-01-28 PROCEDURE — 85027 COMPLETE CBC AUTOMATED: CPT

## (undated) DEVICE — FORCEPS BX L240CM WRK CHN 2.8MM STD CAP W/ NDL MIC MESH